# Patient Record
Sex: FEMALE | Race: WHITE | NOT HISPANIC OR LATINO | Employment: FULL TIME | URBAN - METROPOLITAN AREA
[De-identification: names, ages, dates, MRNs, and addresses within clinical notes are randomized per-mention and may not be internally consistent; named-entity substitution may affect disease eponyms.]

---

## 2023-06-21 ENCOUNTER — APPOINTMENT (OUTPATIENT)
Dept: LAB | Facility: LAB | Age: 48
End: 2023-06-21
Payer: COMMERCIAL

## 2023-06-21 LAB
ALANINE AMINOTRANSFERASE (SGPT) (U/L) IN SER/PLAS: 89 U/L (ref 7–45)
ALBUMIN (G/DL) IN SER/PLAS: 5 G/DL (ref 3.4–5)
ALKALINE PHOSPHATASE (U/L) IN SER/PLAS: 160 U/L (ref 33–110)
ANION GAP IN SER/PLAS: 15 MMOL/L (ref 10–20)
ASPARTATE AMINOTRANSFERASE (SGOT) (U/L) IN SER/PLAS: 38 U/L (ref 9–39)
BASOPHILS (10*3/UL) IN BLOOD BY AUTOMATED COUNT: 0.09 X10E9/L (ref 0–0.1)
BASOPHILS/100 LEUKOCYTES IN BLOOD BY AUTOMATED COUNT: 0.9 % (ref 0–2)
BILIRUBIN TOTAL (MG/DL) IN SER/PLAS: 0.3 MG/DL (ref 0–1.2)
CALCIUM (MG/DL) IN SER/PLAS: 10.2 MG/DL (ref 8.6–10.6)
CARBON DIOXIDE, TOTAL (MMOL/L) IN SER/PLAS: 24 MMOL/L (ref 21–32)
CHLORIDE (MMOL/L) IN SER/PLAS: 104 MMOL/L (ref 98–107)
CREATININE (MG/DL) IN SER/PLAS: 0.58 MG/DL (ref 0.5–1.05)
EOSINOPHILS (10*3/UL) IN BLOOD BY AUTOMATED COUNT: 0.11 X10E9/L (ref 0–0.7)
EOSINOPHILS/100 LEUKOCYTES IN BLOOD BY AUTOMATED COUNT: 1.1 % (ref 0–6)
ERYTHROCYTE DISTRIBUTION WIDTH (RATIO) BY AUTOMATED COUNT: 17 % (ref 11.5–14.5)
ERYTHROCYTE MEAN CORPUSCULAR HEMOGLOBIN CONCENTRATION (G/DL) BY AUTOMATED: 32.8 G/DL (ref 32–36)
ERYTHROCYTE MEAN CORPUSCULAR VOLUME (FL) BY AUTOMATED COUNT: 86 FL (ref 80–100)
ERYTHROCYTES (10*6/UL) IN BLOOD BY AUTOMATED COUNT: 4.69 X10E12/L (ref 4–5.2)
GFR FEMALE: >90 ML/MIN/1.73M2
GLUCOSE (MG/DL) IN SER/PLAS: 99 MG/DL (ref 74–99)
HEMATOCRIT (%) IN BLOOD BY AUTOMATED COUNT: 40.5 % (ref 36–46)
HEMOGLOBIN (G/DL) IN BLOOD: 13.3 G/DL (ref 12–16)
IMMATURE GRANULOCYTES/100 LEUKOCYTES IN BLOOD BY AUTOMATED COUNT: 0.3 % (ref 0–0.9)
INR IN PPP BY COAGULATION ASSAY: 1 (ref 0.9–1.1)
LEUKOCYTES (10*3/UL) IN BLOOD BY AUTOMATED COUNT: 10 X10E9/L (ref 4.4–11.3)
LYMPHOCYTES (10*3/UL) IN BLOOD BY AUTOMATED COUNT: 2.19 X10E9/L (ref 1.2–4.8)
LYMPHOCYTES/100 LEUKOCYTES IN BLOOD BY AUTOMATED COUNT: 21.9 % (ref 13–44)
MONOCYTES (10*3/UL) IN BLOOD BY AUTOMATED COUNT: 0.62 X10E9/L (ref 0.1–1)
MONOCYTES/100 LEUKOCYTES IN BLOOD BY AUTOMATED COUNT: 6.2 % (ref 2–10)
NEUTROPHILS (10*3/UL) IN BLOOD BY AUTOMATED COUNT: 6.97 X10E9/L (ref 1.2–7.7)
NEUTROPHILS/100 LEUKOCYTES IN BLOOD BY AUTOMATED COUNT: 69.6 % (ref 40–80)
NRBC (PER 100 WBCS) BY AUTOMATED COUNT: 0 /100 WBC (ref 0–0)
PLATELETS (10*3/UL) IN BLOOD AUTOMATED COUNT: 433 X10E9/L (ref 150–450)
POTASSIUM (MMOL/L) IN SER/PLAS: 4.7 MMOL/L (ref 3.5–5.3)
PROTEIN TOTAL: 7.6 G/DL (ref 6.4–8.2)
PROTHROMBIN TIME (PT) IN PPP BY COAGULATION ASSAY: 10.8 SEC (ref 9.8–12.8)
SODIUM (MMOL/L) IN SER/PLAS: 138 MMOL/L (ref 136–145)
UREA NITROGEN (MG/DL) IN SER/PLAS: 23 MG/DL (ref 6–23)

## 2023-09-05 ENCOUNTER — OFFICE VISIT (OUTPATIENT)
Dept: PRIMARY CARE | Facility: CLINIC | Age: 48
End: 2023-09-05
Payer: COMMERCIAL

## 2023-09-05 VITALS
DIASTOLIC BLOOD PRESSURE: 84 MMHG | SYSTOLIC BLOOD PRESSURE: 142 MMHG | HEIGHT: 62 IN | BODY MASS INDEX: 33.86 KG/M2 | WEIGHT: 184 LBS

## 2023-09-05 DIAGNOSIS — Z13.220 LIPID SCREENING: ICD-10-CM

## 2023-09-05 DIAGNOSIS — E13.9 OTHER SPECIFIED DIABETES MELLITUS WITHOUT COMPLICATION, WITHOUT LONG-TERM CURRENT USE OF INSULIN (MULTI): ICD-10-CM

## 2023-09-05 DIAGNOSIS — F32.A DEPRESSION, UNSPECIFIED DEPRESSION TYPE: ICD-10-CM

## 2023-09-05 DIAGNOSIS — F41.9 ANXIETY: ICD-10-CM

## 2023-09-05 DIAGNOSIS — E55.9 VITAMIN D DEFICIENCY: ICD-10-CM

## 2023-09-05 DIAGNOSIS — R53.83 OTHER FATIGUE: ICD-10-CM

## 2023-09-05 PROBLEM — D50.8 IRON DEFICIENCY ANEMIA SECONDARY TO INADEQUATE DIETARY IRON INTAKE: Status: ACTIVE | Noted: 2023-09-05

## 2023-09-05 PROBLEM — E87.6 HYPOKALEMIA: Status: ACTIVE | Noted: 2023-09-05

## 2023-09-05 PROCEDURE — 3077F SYST BP >= 140 MM HG: CPT | Performed by: INTERNAL MEDICINE

## 2023-09-05 PROCEDURE — 3079F DIAST BP 80-89 MM HG: CPT | Performed by: INTERNAL MEDICINE

## 2023-09-05 PROCEDURE — 99203 OFFICE O/P NEW LOW 30 MIN: CPT | Performed by: INTERNAL MEDICINE

## 2023-09-05 RX ORDER — BUPROPION HYDROCHLORIDE 150 MG/1
150 TABLET, EXTENDED RELEASE ORAL 2 TIMES DAILY
Qty: 180 TABLET | Refills: 0 | Status: SHIPPED | OUTPATIENT
Start: 2023-09-05 | End: 2024-01-18 | Stop reason: SDUPTHER

## 2023-09-05 RX ORDER — METFORMIN HYDROCHLORIDE 1000 MG/1
1000 TABLET ORAL
Qty: 90 TABLET | Refills: 0 | Status: SHIPPED | OUTPATIENT
Start: 2023-09-05 | End: 2023-10-12 | Stop reason: DRUGHIGH

## 2023-09-05 RX ORDER — BUPROPION HYDROCHLORIDE 100 MG/1
TABLET, EXTENDED RELEASE ORAL EVERY 24 HOURS
COMMUNITY
End: 2023-09-05 | Stop reason: SDUPTHER

## 2023-09-05 ASSESSMENT — ENCOUNTER SYMPTOMS
LOSS OF SENSATION IN FEET: 0
OCCASIONAL FEELINGS OF UNSTEADINESS: 0
DEPRESSION: 0

## 2023-09-05 NOTE — PROGRESS NOTES
OFFICE NOTE    NAME OF THE PATIENT: Debby Cabrera    YOB: 1975    CHIEF COMPLAINT:  This is a 48-year-old white lady came to my office first time, I welcomed her.  She told me:  She has gained some weight.  She had a gastric bypass 13 years ago.  She has type 2 diabetes.  She wants to readdress the issue.  Anxiety and stress.  She ran out of Wellbutrin.  She wants to go on Wellbutrin.  She is looking for new primary care doctor since her doctor's office has closed down.    PAST MEDICAL HISTORY:  Reviewed from EMR.  She has type 2 diabetes, anxiety, depression, postmenopausal.  Status post gastric bypass.  Her peak weight was 258 pounds, she was down to 118 pounds, basically she lost 150 pounds by 2010.  Now, she is back.  She is gained almost half of that.    CURRENT MEDICATIONS:  Reviewed from EMR.  She is on Wellbutrin, she ran out and metformin, she is not taking.  Intravenous iron and B12 for aplastic anemia.  ______ medication.    ALLERGIES:  Reviewed from EMR.  Amoxicillin.    SOCIAL HISTORY:  Reviewed from EMR.  She does not smoke and does not drink alcohol.  Occupation, retired nurse.  She is single.  Two children.  Children okay.    FAMILY HISTORY:  Reviewed from EMR.  Mother had a breast cancer.  Father has hypertension.    IMMUNIZATION:  Tetanus within the last 10 years.    REVIEW OF SYSTEMS:  No heart attack.  No stroke.  No cancer.  All 12 systems reviewed and pertaining covered in history and physical.    PHYSICAL EXAMINATION  VITAL SIGNS:  As recorded and reviewed from EMR.  EYES:  The patient's sclerae white.  The pupils were equal and round.  ENT:  The patient's external ears were normal and the otoscopic examination was negative.  NECK:  Supple.  There were no palpable masses.  Thyroid was not enlarged and there were no carotid bruits.  RESPIRATORY:  The patient had normal inspirations and expirations.  The breath sounds were equal bilaterally and clear to  "auscultation.  CARDIOVASCULAR:  The patient had S1 normal, split S2 without obvious rubs, clicks, or murmurs.  GASTROINTESTINAL:  There was no hepatosplenomegaly.  There were no palpable masses and no inguinal nodes.  LYMPHATIC:  The patient had no axillary, groin, or lymphadenopathy.  MUSCULOSKELETAL:  The patient had normal gait.  The joints appeared to be normal without evidence of deformity.  Grossly the muscles had good range of motion and were without effusion.  EXTREMITIES:  There was no evidence of peripheral edema.  NEUROLOGIC:  The patient had normal cranial nerves.  The reflexes, sensory, and motor examination were grossly within normal limits.  PSYCHIATRIC:  The patient had normal judgment and insight.  The patient was oriented to person, place, and time, and had no obvious mood defect including depression, anxiety, and/or agitation.  BREAST: Deferred by the patient.  VAGINAL: Deferred by the patient.  RECTAL: Deferred by the patient.    LAB WORK:  Laboratory testing discussed.    ASSESSMENT AND PLAN:  Status post gastric bypass.  Monitor.  Sleep apnea.  She needs a new CPAP machine and sleep machine.  Pre-diabetic.  I will check hemoglobin A1c and blood work.  We will start metformin.  She will benefit from drugs like Mounjaro.  Gynecologist.  She is going to set up Pap test and mammogram.  She already has it.  Colonoscopy started at 50.  Immunization.  We will monitor.  Complete blood work ordered.  I welcomed her to my office.  I will see her in a week after test.  I will ______ the patient has uterine fibroid.  ______ bleeds a lot.  She is going to have procedure.      Kindly review this note in conjunction with EMR.   Subjective   Patient ID: Debby Cabrera is a 48 y.o. female who presents for New Patient Visit.      HPI    Review of Systems    Objective   /84   Ht 1.575 m (5' 2\")   Wt 83.5 kg (184 lb)   BMI 33.65 kg/m²       Physical Exam    Assessment/Plan   Problem List Items Addressed " This Visit       Anxiety    Relevant Medications    buPROPion SR (Wellbutrin SR) 150 mg 12 hr tablet    Depression    Relevant Medications    buPROPion SR (Wellbutrin SR) 150 mg 12 hr tablet     Other Visit Diagnoses       Other specified diabetes mellitus without complication, without long-term current use of insulin (CMS/[facility])        Relevant Medications    metFORMIN (Glucophage) 1,000 mg tablet    Other Relevant Orders    Hemoglobin A1c    Lipid screening        Relevant Orders    Comprehensive metabolic panel    Lipid panel    Other fatigue        Relevant Orders    CBC    Urinalysis with Reflex Microscopic    TSH    Vitamin B12    Vitamin D deficiency        Relevant Orders    Vitamin D 25-Hydroxy,Total (for eval of Vitamin D levels)

## 2023-09-06 ENCOUNTER — LAB (OUTPATIENT)
Dept: LAB | Facility: LAB | Age: 48
End: 2023-09-06
Payer: COMMERCIAL

## 2023-09-06 DIAGNOSIS — Z13.220 LIPID SCREENING: ICD-10-CM

## 2023-09-06 DIAGNOSIS — E55.9 VITAMIN D DEFICIENCY: ICD-10-CM

## 2023-09-06 DIAGNOSIS — R53.83 OTHER FATIGUE: ICD-10-CM

## 2023-09-06 DIAGNOSIS — E13.9 OTHER SPECIFIED DIABETES MELLITUS WITHOUT COMPLICATION, WITHOUT LONG-TERM CURRENT USE OF INSULIN (MULTI): ICD-10-CM

## 2023-09-06 LAB
ALANINE AMINOTRANSFERASE (SGPT) (U/L) IN SER/PLAS: 29 U/L (ref 7–45)
ALBUMIN (G/DL) IN SER/PLAS: 4.8 G/DL (ref 3.4–5)
ALKALINE PHOSPHATASE (U/L) IN SER/PLAS: 93 U/L (ref 33–110)
ANION GAP IN SER/PLAS: 19 MMOL/L (ref 10–20)
APPEARANCE, URINE: NORMAL
ASPARTATE AMINOTRANSFERASE (SGOT) (U/L) IN SER/PLAS: 19 U/L (ref 9–39)
BILIRUBIN TOTAL (MG/DL) IN SER/PLAS: 0.3 MG/DL (ref 0–1.2)
BILIRUBIN, URINE: NEGATIVE
BLOOD, URINE: NEGATIVE
CALCIDIOL (25 OH VITAMIN D3) (NG/ML) IN SER/PLAS: 16 NG/ML
CALCIUM (MG/DL) IN SER/PLAS: 10.1 MG/DL (ref 8.6–10.6)
CARBON DIOXIDE, TOTAL (MMOL/L) IN SER/PLAS: 21 MMOL/L (ref 21–32)
CHLORIDE (MMOL/L) IN SER/PLAS: 105 MMOL/L (ref 98–107)
CHOLESTEROL (MG/DL) IN SER/PLAS: 208 MG/DL (ref 0–199)
CHOLESTEROL IN HDL (MG/DL) IN SER/PLAS: 51.3 MG/DL
CHOLESTEROL/HDL RATIO: 4.1
COBALAMIN (VITAMIN B12) (PG/ML) IN SER/PLAS: 459 PG/ML (ref 211–911)
COLOR, URINE: YELLOW
CREATININE (MG/DL) IN SER/PLAS: 0.68 MG/DL (ref 0.5–1.05)
ERYTHROCYTE DISTRIBUTION WIDTH (RATIO) BY AUTOMATED COUNT: 13.8 % (ref 11.5–14.5)
ERYTHROCYTE MEAN CORPUSCULAR HEMOGLOBIN CONCENTRATION (G/DL) BY AUTOMATED: 32.3 G/DL (ref 32–36)
ERYTHROCYTE MEAN CORPUSCULAR VOLUME (FL) BY AUTOMATED COUNT: 86 FL (ref 80–100)
ERYTHROCYTES (10*6/UL) IN BLOOD BY AUTOMATED COUNT: 4.86 X10E12/L (ref 4–5.2)
ESTIMATED AVERAGE GLUCOSE FOR HBA1C: 105 MG/DL
GFR FEMALE: >90 ML/MIN/1.73M2
GLUCOSE (MG/DL) IN SER/PLAS: 198 MG/DL (ref 74–99)
GLUCOSE, URINE: NEGATIVE MG/DL
HEMATOCRIT (%) IN BLOOD BY AUTOMATED COUNT: 41.8 % (ref 36–46)
HEMOGLOBIN (G/DL) IN BLOOD: 13.5 G/DL (ref 12–16)
HEMOGLOBIN A1C/HEMOGLOBIN TOTAL IN BLOOD: 5.3 %
KETONES, URINE: NEGATIVE MG/DL
LDL: 115 MG/DL (ref 0–99)
LEUKOCYTE ESTERASE, URINE: NEGATIVE
LEUKOCYTES (10*3/UL) IN BLOOD BY AUTOMATED COUNT: 9.5 X10E9/L (ref 4.4–11.3)
NITRITE, URINE: NEGATIVE
NON HDL CHOLESTEROL: 157 MG/DL
NRBC (PER 100 WBCS) BY AUTOMATED COUNT: 0 /100 WBC (ref 0–0)
PH, URINE: 5 (ref 5–8)
PLATELETS (10*3/UL) IN BLOOD AUTOMATED COUNT: 462 X10E9/L (ref 150–450)
POTASSIUM (MMOL/L) IN SER/PLAS: 3.8 MMOL/L (ref 3.5–5.3)
PROTEIN TOTAL: 7.2 G/DL (ref 6.4–8.2)
PROTEIN, URINE: NEGATIVE MG/DL
SODIUM (MMOL/L) IN SER/PLAS: 141 MMOL/L (ref 136–145)
SPECIFIC GRAVITY, URINE: 1.03 (ref 1–1.03)
THYROTROPIN (MIU/L) IN SER/PLAS BY DETECTION LIMIT <= 0.05 MIU/L: 2.09 MIU/L (ref 0.44–3.98)
TRIGLYCERIDE (MG/DL) IN SER/PLAS: 208 MG/DL (ref 0–149)
UREA NITROGEN (MG/DL) IN SER/PLAS: 14 MG/DL (ref 6–23)
UROBILINOGEN, URINE: <2 MG/DL (ref 0–1.9)
VLDL: 42 MG/DL (ref 0–40)

## 2023-09-06 PROCEDURE — 85027 COMPLETE CBC AUTOMATED: CPT

## 2023-09-06 PROCEDURE — 82306 VITAMIN D 25 HYDROXY: CPT

## 2023-09-06 PROCEDURE — 80053 COMPREHEN METABOLIC PANEL: CPT

## 2023-09-06 PROCEDURE — 82607 VITAMIN B-12: CPT

## 2023-09-06 PROCEDURE — 81003 URINALYSIS AUTO W/O SCOPE: CPT

## 2023-09-06 PROCEDURE — 36415 COLL VENOUS BLD VENIPUNCTURE: CPT

## 2023-09-06 PROCEDURE — 84443 ASSAY THYROID STIM HORMONE: CPT

## 2023-09-06 PROCEDURE — 83036 HEMOGLOBIN GLYCOSYLATED A1C: CPT

## 2023-09-06 PROCEDURE — 80061 LIPID PANEL: CPT

## 2023-09-11 ENCOUNTER — OFFICE VISIT (OUTPATIENT)
Dept: PRIMARY CARE | Facility: CLINIC | Age: 48
End: 2023-09-11
Payer: COMMERCIAL

## 2023-09-11 VITALS
DIASTOLIC BLOOD PRESSURE: 84 MMHG | BODY MASS INDEX: 33.49 KG/M2 | HEIGHT: 62 IN | SYSTOLIC BLOOD PRESSURE: 126 MMHG | WEIGHT: 182 LBS

## 2023-09-11 DIAGNOSIS — E88.810 DYSMETABOLIC SYNDROME: ICD-10-CM

## 2023-09-11 DIAGNOSIS — E78.00 HIGH CHOLESTEROL: ICD-10-CM

## 2023-09-11 DIAGNOSIS — F32.A DEPRESSION, UNSPECIFIED DEPRESSION TYPE: ICD-10-CM

## 2023-09-11 DIAGNOSIS — F41.9 ANXIETY: ICD-10-CM

## 2023-09-11 DIAGNOSIS — E11.9 TYPE 2 DIABETES MELLITUS WITHOUT COMPLICATION, WITHOUT LONG-TERM CURRENT USE OF INSULIN (MULTI): Primary | ICD-10-CM

## 2023-09-11 DIAGNOSIS — E66.3 OVERWEIGHT: ICD-10-CM

## 2023-09-11 DIAGNOSIS — E55.9 VITAMIN D DEFICIENCY: ICD-10-CM

## 2023-09-11 PROCEDURE — 3044F HG A1C LEVEL LT 7.0%: CPT | Performed by: INTERNAL MEDICINE

## 2023-09-11 PROCEDURE — 3079F DIAST BP 80-89 MM HG: CPT | Performed by: INTERNAL MEDICINE

## 2023-09-11 PROCEDURE — 3074F SYST BP LT 130 MM HG: CPT | Performed by: INTERNAL MEDICINE

## 2023-09-11 PROCEDURE — 99213 OFFICE O/P EST LOW 20 MIN: CPT | Performed by: INTERNAL MEDICINE

## 2023-09-11 RX ORDER — ACETAMINOPHEN 500 MG
5000 TABLET ORAL DAILY
Qty: 30 TABLET | Refills: 0 | Status: SHIPPED | OUTPATIENT
Start: 2023-09-11

## 2023-09-11 ASSESSMENT — ENCOUNTER SYMPTOMS
DEPRESSION: 0
LOSS OF SENSATION IN FEET: 0
OCCASIONAL FEELINGS OF UNSTEADINESS: 0

## 2023-09-11 NOTE — PROGRESS NOTES
"Subjective   Patient ID: Debby Cabrera is a 48 y.o. female who presents for Follow-up.      HPI    Review of Systems    Objective   /84   Ht 1.575 m (5' 2\")   Wt 82.6 kg (182 lb)   BMI 33.29 kg/m²       Physical Exam    Assessment/Plan   Problem List Items Addressed This Visit    None        "

## 2023-09-11 NOTE — PROGRESS NOTES
"Subjective   Patient ID: Debby Cabrera is a 48 y.o. female who presents for Follow-up.    HPI   Debby Cabrera today came here for multiple medical issues.  Overall, she is a happy person.  No chest pain or shortness of breath.  Taking medications regularly.  No side effects.  She told me that when she takes metformin, sometimes she gets diarrhea, abdominal cramping.  She is not consistent with that.  She wants to try Ozempic to lose weight.  She is trying hard to lose weight.  Appetite and weight are okay.  No problem.    Review of Systems  She never had a heart attack.  No stroke.  No cancer.  All 12 systems reviewed and pertaining covered in history and physical.    Objective   /84   Ht 1.575 m (5' 2\")   Wt 82.6 kg (182 lb)   BMI 33.29 kg/m²     Physical Exam  EYES:  The patient’s sclerae white.  The pupils were equal and round.  ENT:  The patient’s external ears were normal and the otoscopic examination was negative.  NECK:  Supple.  There were no palpable masses.  Thyroid was not enlarged and there were no carotid bruits.  RESPIRATORY:  The patient had normal inspirations and expirations.  The breath sounds were equal bilaterally and clear to auscultation.  CARDIOVASCULAR:  The patient had S1 normal, split S2 without obvious rubs, clicks, or murmurs.  GASTROINTESTINAL:  There was no hepatosplenomegaly.  There were no palpable masses and no inguinal nodes.  LYMPHATIC:  The patient had no axillary, groin, or lymphadenopathy.  MUSCULOSKELETAL:  The patient had normal gait.  The joints appeared to be normal without evidence of deformity.  Grossly the muscles had good range of motion and were without effusion.  EXTREMITIES:  There was no evidence of peripheral edema.  NEUROLOGIC:  The patient had normal cranial nerves.  The reflexes, sensory, and motor examination were grossly within normal limits.  PSYCHIATRIC:  The patient had normal judgment and insight.  The patient was oriented to person, place, and time, " and had no obvious mood defect including depression, anxiety, and/or agitation.  BREAST:  Deferred by the patient.  VAGINAL:  Deferred by the patient.  RECTAL:  Deferred by the patient.    LAB WORK:  Laboratory testing done discussed.    Assessment/Plan     1. Low vitamin D.  Take vitamin D.  2. Type 2 diabetes, overweight, cannot lose weight, family history.  Metformin she is inconsistent.  Idiosyncrasy is that metformin she is not taking.  Fasting sugar is high and the patient says that sugar goes up and down.  3. It looks like probably the patient has dysmetabolic syndrome along with the clinical diabetes, cannot tolerate metformin.  She wants to try Ozempic.  Hoping that it will get her sugar under control and lose weight, but I told her danger of hypoglycemia, loss of appetite and weakness.  She is willing to take that risk.  4. Anxiety and depression, okay.  5. Cholesterol.  Diet, exercise.  Monitor.  6. Overweight.  Ozempic should help her diet.  7. I shall see her back in a month.  I explained her we will start with very low dose.  8. Continue to follow.    Scribe Attestation  By signing my name below, I, Bimal Batista   attest that this documentation has been prepared under the direction and in the presence of Monse Laura MD.

## 2023-09-14 DIAGNOSIS — E11.9 TYPE 2 DIABETES MELLITUS WITHOUT COMPLICATION, WITHOUT LONG-TERM CURRENT USE OF INSULIN (MULTI): ICD-10-CM

## 2023-09-28 PROBLEM — D64.9 ANEMIA: Status: ACTIVE | Noted: 2023-09-28

## 2023-09-28 PROBLEM — N93.9 ABNORMAL UTERINE BLEEDING: Status: ACTIVE | Noted: 2023-09-28

## 2023-09-28 PROBLEM — N12 PYELONEPHRITIS: Status: ACTIVE | Noted: 2023-09-28

## 2023-09-28 PROBLEM — R35.0 URINARY FREQUENCY: Status: ACTIVE | Noted: 2023-09-28

## 2023-09-28 PROBLEM — D21.9 FIBROID: Status: ACTIVE | Noted: 2023-09-28

## 2023-09-28 PROBLEM — B37.0 ORAL CANDIDIASIS: Status: ACTIVE | Noted: 2023-09-28

## 2023-09-28 PROBLEM — K14.0 GLOSSITIS: Status: ACTIVE | Noted: 2023-09-28

## 2023-09-28 RX ORDER — LIDOCAINE HYDROCHLORIDE 40 MG/ML
5 SOLUTION TOPICAL 4 TIMES DAILY PRN
COMMUNITY

## 2023-09-28 RX ORDER — NORETHINDRONE 5 MG/1
5 TABLET ORAL 2 TIMES DAILY
COMMUNITY
End: 2024-01-18 | Stop reason: WASHOUT

## 2023-09-28 RX ORDER — NYSTATIN 100000 [USP'U]/ML
500000 SUSPENSION ORAL 4 TIMES DAILY
COMMUNITY

## 2023-09-29 DIAGNOSIS — M25.512 LEFT SHOULDER PAIN, UNSPECIFIED CHRONICITY: ICD-10-CM

## 2023-10-02 ENCOUNTER — OFFICE VISIT (OUTPATIENT)
Dept: ORTHOPEDIC SURGERY | Facility: CLINIC | Age: 48
End: 2023-10-02
Payer: COMMERCIAL

## 2023-10-02 ENCOUNTER — ROUTINE PRENATAL (OUTPATIENT)
Dept: OBSTETRICS AND GYNECOLOGY | Facility: CLINIC | Age: 48
End: 2023-10-02
Payer: COMMERCIAL

## 2023-10-02 ENCOUNTER — HOSPITAL ENCOUNTER (OUTPATIENT)
Dept: RADIOLOGY | Facility: HOSPITAL | Age: 48
Discharge: HOME | End: 2023-10-02
Payer: COMMERCIAL

## 2023-10-02 DIAGNOSIS — M25.512 ACUTE PAIN OF LEFT SHOULDER: ICD-10-CM

## 2023-10-02 DIAGNOSIS — M25.512 LEFT SHOULDER PAIN, UNSPECIFIED CHRONICITY: ICD-10-CM

## 2023-10-02 DIAGNOSIS — D25.9 UTERINE LEIOMYOMA, UNSPECIFIED LOCATION: Primary | ICD-10-CM

## 2023-10-02 PROCEDURE — 73030 X-RAY EXAM OF SHOULDER: CPT | Mod: LT,FY

## 2023-10-02 PROCEDURE — 73030 X-RAY EXAM OF SHOULDER: CPT | Mod: LEFT SIDE | Performed by: RADIOLOGY

## 2023-10-02 PROCEDURE — 99203 OFFICE O/P NEW LOW 30 MIN: CPT

## 2023-10-02 ASSESSMENT — ENCOUNTER SYMPTOMS
HEMATOLOGIC/LYMPHATIC NEGATIVE: 0
NEUROLOGICAL NEGATIVE: 0
CARDIOVASCULAR NEGATIVE: 0
PSYCHIATRIC NEGATIVE: 0
ALLERGIC/IMMUNOLOGIC NEGATIVE: 0
ENDOCRINE NEGATIVE: 0
MUSCULOSKELETAL NEGATIVE: 0
CONSTITUTIONAL NEGATIVE: 0
GASTROINTESTINAL NEGATIVE: 0
EYES NEGATIVE: 0
RESPIRATORY NEGATIVE: 0

## 2023-10-02 NOTE — PROGRESS NOTES
HPI  Debby Cabrera is a 48 y.o. female  in office today for   Chief Complaint   Patient presents with    Left Shoulder - Pain, Edema   .  she states that about 2 weeks she was lifting something and felt a snap in her shoulder.  Did not have any pain immediately, but had pain the next day.  She states no improvement in pain in the last 2 weeks.  She has been taking Motrin which she can't take much of as she is post gastric bypass.  She has also been icing and heating.        Medication  Current Outpatient Medications on File Prior to Visit   Medication Sig Dispense Refill    buPROPion SR (Wellbutrin SR) 150 mg 12 hr tablet Take 1 tablet (150 mg) by mouth 2 times a day. 180 tablet 0    cholecalciferol (Vitamin D3) 5,000 Units tablet Take 1 tablet (5,000 Units) by mouth once daily. 30 tablet 0    lidocaine (Xylocaine) 4 % (40 mg/mL) external solution Take 5 mL by mouth 4 times a day as needed. SWALLOW AND SPIT      metFORMIN (Glucophage) 1,000 mg tablet Take 1 tablet (1,000 mg) by mouth once daily with a meal. 90 tablet 0    norethindrone (Aygestin) 5 mg tablet Take 1 tablet (5 mg) by mouth 2 times a day.      nystatin (Mycostatin) 100,000 unit/mL suspension Take 5 mL (500,000 Units) by mouth 4 times a day. SWISH AND SPIT, TO PREVENT DRY MOUTH AND MUCOSITIS      semaglutide 0.25 mg or 0.5 mg (2 mg/3 mL) pen injector Inject 0.5 mg under the skin 1 (one) time per week. 3 mL 0     No current facility-administered medications on file prior to visit.       Physical Exam  Constitutional: well developed, well nourished female in no acute distress  Psychiatric: normal mood, appropriate affect  Eyes: sclera anicteric  HENT: normocephalic/atraumatic  CV: regular rate and rhythm   Respiratory: non labored breathing  Integumentary: no rash  Neurological: moves all extremities    Right Shoulder Exam     Range of Motion   External rotation:  80     Muscle Strength   Abduction: 5/5   External rotation: 5/5       Left Shoulder Exam      Tenderness   The patient is experiencing tenderness in the biceps tendon.    Range of Motion   Active abduction:  90   Passive abduction:  110 (secondary to pain)   External rotation:  30   Forward flexion:  100   Internal rotation 0 degrees:  abnormal   Internal rotation 90 degrees:  abnormal     Muscle Strength   Abduction: 4/5   External rotation: 4/5     Tests   Apprehension: negative  Cross arm: positive  Impingement: positive    Other   Erythema: absent  Scars: absent               Imaging/Lab:  X-rays were taken today which were reviewed by myself and read by radiology and show mild AC joint osteoarthritis      Assessment  Assessment: Left shoulder pain    Plan  Plan:  History, physical exam, and imaging were reviewed with patient. Discussed starting with conservative treatment of the shoulder including RICE, injections, OT.  She is not excited about injections, but willing to try OT.  OT orders given to patient.  Medication: Recommending topical diclofenac as she is unable to take NSAIDs post gastric bypass.  Follow Up: Patient to follow up after trial of therapy.    All questions were answered for the patient prior to end of exam and patient addressed their understanding.    Tonia Schaefer PA-C  10/02/23

## 2023-10-02 NOTE — ASSESSMENT & PLAN NOTE
Pt is concerned  about her heavy bleeding   Pt wants a UFE    Will plan for UFE  Continue Aygestin for now

## 2023-10-02 NOTE — LETTER
October 2, 2023     Monse Laura MD  [address] [place] [address]   [facility], [address]  [place] [address]    Patient: Debby Cabrera   YOB: 1975   Date of Visit: 10/2/2023       Dear Dr. Monse Laura MD:    Thank you for referring Debby Cabrera to me for evaluation. Below are my notes for this consultation.  If you have questions, please do not hesitate to call me. I look forward to following your patient along with you.       Sincerely,     Tonia Schaefer PA-C      CC: No Recipients  ______________________________________________________________________________________    HPI  Debby Cabrera is a 48 y.o. female  in office today for   Chief Complaint   Patient presents with   • Left Shoulder - Pain, Edema   .  she states that about 2 weeks she was lifting something and felt a snap in her shoulder.  Did not have any pain immediately, but had pain the next day.  She states no improvement in pain in the last 2 weeks.  She has been taking Motrin which she can't take much of as she is post gastric bypass.  She has also been icing and heating.        Medication  Current Outpatient Medications on File Prior to Visit   Medication Sig Dispense Refill   • buPROPion SR (Wellbutrin SR) 150 mg 12 hr tablet Take 1 tablet (150 mg) by mouth 2 times a day. 180 tablet 0   • cholecalciferol (Vitamin D3) 5,000 Units tablet Take 1 tablet (5,000 Units) by mouth once daily. 30 tablet 0   • lidocaine (Xylocaine) 4 % (40 mg/mL) external solution Take 5 mL by mouth 4 times a day as needed. SWALLOW AND SPIT     • metFORMIN (Glucophage) 1,000 mg tablet Take 1 tablet (1,000 mg) by mouth once daily with a meal. 90 tablet 0   • norethindrone (Aygestin) 5 mg tablet Take 1 tablet (5 mg) by mouth 2 times a day.     • nystatin (Mycostatin) 100,000 unit/mL suspension Take 5 mL (500,000 Units) by mouth 4 times a day. SWISH AND SPIT, TO PREVENT DRY MOUTH AND MUCOSITIS     • semaglutide 0.25 mg or 0.5 mg (2 mg/3 mL) pen injector  Inject 0.5 mg under the skin 1 (one) time per week. 3 mL 0     No current facility-administered medications on file prior to visit.       Physical Exam  Constitutional: well developed, well nourished female in no acute distress  Psychiatric: normal mood, appropriate affect  Eyes: sclera anicteric  HENT: normocephalic/atraumatic  CV: regular rate and rhythm   Respiratory: non labored breathing  Integumentary: no rash  Neurological: moves all extremities    Right Shoulder Exam     Range of Motion   External rotation:  80     Muscle Strength   Abduction: 5/5   External rotation: 5/5       Left Shoulder Exam     Tenderness   The patient is experiencing tenderness in the biceps tendon.    Range of Motion   Active abduction:  90   Passive abduction:  110 (secondary to pain)   External rotation:  30   Forward flexion:  100   Internal rotation 0 degrees:  abnormal   Internal rotation 90 degrees:  abnormal     Muscle Strength   Abduction: 4/5   External rotation: 4/5     Tests   Apprehension: negative  Cross arm: positive  Impingement: positive    Other   Erythema: absent  Scars: absent               Imaging/Lab:  X-rays were taken today which were reviewed by myself and read by radiology and show mild AC joint osteoarthritis      Assessment  Assessment: Left shoulder pain    Plan  Plan:  History, physical exam, and imaging were reviewed with patient. Discussed starting with conservative treatment of the shoulder including RICE, injections, OT.  She is not excited about injections, but willing to try OT.  OT orders given to patient.  Medication: Recommending topical diclofenac as she is unable to take NSAIDs post gastric bypass.  Follow Up: Patient to follow up after trial of therapy.    All questions were answered for the patient prior to end of exam and patient addressed their understanding.    Tonia Schaefer PA-C  10/02/23

## 2023-10-12 ENCOUNTER — OFFICE VISIT (OUTPATIENT)
Dept: PRIMARY CARE | Facility: CLINIC | Age: 48
End: 2023-10-12
Payer: MEDICAID

## 2023-10-12 VITALS
BODY MASS INDEX: 31.65 KG/M2 | WEIGHT: 172 LBS | SYSTOLIC BLOOD PRESSURE: 124 MMHG | DIASTOLIC BLOOD PRESSURE: 72 MMHG | HEIGHT: 62 IN

## 2023-10-12 DIAGNOSIS — R53.83 OTHER FATIGUE: ICD-10-CM

## 2023-10-12 DIAGNOSIS — F41.9 ANXIETY AND DEPRESSION: ICD-10-CM

## 2023-10-12 DIAGNOSIS — F32.A ANXIETY AND DEPRESSION: ICD-10-CM

## 2023-10-12 DIAGNOSIS — E78.00 HIGH CHOLESTEROL: ICD-10-CM

## 2023-10-12 DIAGNOSIS — E11.9 TYPE 2 DIABETES MELLITUS WITHOUT COMPLICATION, WITHOUT LONG-TERM CURRENT USE OF INSULIN (MULTI): Primary | ICD-10-CM

## 2023-10-12 PROCEDURE — 3044F HG A1C LEVEL LT 7.0%: CPT | Performed by: INTERNAL MEDICINE

## 2023-10-12 PROCEDURE — 99214 OFFICE O/P EST MOD 30 MIN: CPT | Performed by: INTERNAL MEDICINE

## 2023-10-12 PROCEDURE — 3074F SYST BP LT 130 MM HG: CPT | Performed by: INTERNAL MEDICINE

## 2023-10-12 PROCEDURE — 3078F DIAST BP <80 MM HG: CPT | Performed by: INTERNAL MEDICINE

## 2023-10-12 RX ORDER — METFORMIN HYDROCHLORIDE 1000 MG/1
1000 TABLET ORAL
Qty: 180 TABLET | Refills: 0 | Status: SHIPPED | OUTPATIENT
Start: 2023-10-12 | End: 2023-11-22 | Stop reason: SDUPTHER

## 2023-10-13 NOTE — PROGRESS NOTES
"Subjective   Patient ID: Debby Cabrera is a 48 y.o. female who presents for Follow-up (pharmacy).    It is always a delight to serve Ms. Cabrera.  Today she came here for follow-up.  She found the pharmacy where she can get drug inexpensively.  She is feeling much better.    I have personally reviewed the patient's Past Medical History, Medications, Allergies, Social History, and Family History in the EMR.    Review of Systems   All other systems reviewed and are negative.    Objective   /72   Ht 1.575 m (5' 2\")   Wt 78 kg (172 lb)   BMI 31.46 kg/m²     Physical Exam  Vitals reviewed.   Cardiovascular:      Heart sounds: Normal heart sounds, S1 normal and S2 normal. No murmur heard.     No friction rub.   Pulmonary:      Effort: Pulmonary effort is normal.      Breath sounds: Normal breath sounds and air entry.   Abdominal:      Palpations: There is no hepatomegaly, splenomegaly or mass.   Musculoskeletal:      Right lower leg: No edema.      Left lower leg: No edema.   Lymphadenopathy:      Lower Body: No right inguinal adenopathy. No left inguinal adenopathy.   Neurological:      Cranial Nerves: Cranial nerves 2-12 are intact.      Sensory: No sensory deficit.      Motor: Motor function is intact.      Deep Tendon Reflexes: Reflexes are normal and symmetric.     LAB WORK: Laboratory testing discussed.    Assessment/Plan   Problem List Items Addressed This Visit    None  Visit Diagnoses         Codes    Type 2 diabetes mellitus without complication, without long-term current use of insulin (CMS/[facility])    -  Primary E11.9    Relevant Medications    semaglutide (OZEMPIC) 1 mg/dose (4 mg/3 mL) pen injector    metFORMIN (Glucophage) 1,000 mg tablet    Other Relevant Orders    Hemoglobin A1c    High cholesterol     E78.00    Relevant Orders    Comprehensive metabolic panel    Lipid panel    Other fatigue     R53.83    Relevant Orders    TSH    Anxiety and depression     F41.9, F32.A        1. Type 2 diabetes.  Keep " metformin 1 g twice a day.  Ozempic 0.5 mg, we are going to start.  2. Anxiety and depression, okay.  3. Cholesterol.  Monitor.  4. Follow up in a month with repeat blood work.  Happy to see her anytime sooner if necessary.    Scribe Attestation  By signing my name below, IBarby Scribe attest that this documentation has been prepared under the direction and in the presence of Monse Laura MD.

## 2023-11-22 DIAGNOSIS — E11.9 TYPE 2 DIABETES MELLITUS WITHOUT COMPLICATION, WITHOUT LONG-TERM CURRENT USE OF INSULIN (MULTI): ICD-10-CM

## 2023-11-22 RX ORDER — METFORMIN HYDROCHLORIDE 1000 MG/1
1000 TABLET ORAL
Qty: 60 TABLET | Refills: 0 | Status: SHIPPED | OUTPATIENT
Start: 2023-11-22 | End: 2024-01-05 | Stop reason: SDUPTHER

## 2023-11-24 ENCOUNTER — OFFICE VISIT (OUTPATIENT)
Dept: PRIMARY CARE | Facility: CLINIC | Age: 48
End: 2023-11-24
Payer: COMMERCIAL

## 2023-11-24 VITALS
SYSTOLIC BLOOD PRESSURE: 124 MMHG | DIASTOLIC BLOOD PRESSURE: 68 MMHG | BODY MASS INDEX: 30.36 KG/M2 | WEIGHT: 165 LBS | HEIGHT: 62 IN

## 2023-11-24 DIAGNOSIS — F32.A ANXIETY AND DEPRESSION: Primary | ICD-10-CM

## 2023-11-24 DIAGNOSIS — N92.2 EXCESSIVE MENSTRUATION AT PUBERTY: ICD-10-CM

## 2023-11-24 DIAGNOSIS — D50.0 ANEMIA DUE TO BLOOD LOSS: ICD-10-CM

## 2023-11-24 DIAGNOSIS — E78.00 HIGH CHOLESTEROL: ICD-10-CM

## 2023-11-24 DIAGNOSIS — F41.9 ANXIETY AND DEPRESSION: Primary | ICD-10-CM

## 2023-11-24 DIAGNOSIS — E11.9 TYPE 2 DIABETES MELLITUS WITHOUT COMPLICATION, WITHOUT LONG-TERM CURRENT USE OF INSULIN (MULTI): ICD-10-CM

## 2023-11-24 PROCEDURE — 3078F DIAST BP <80 MM HG: CPT | Performed by: INTERNAL MEDICINE

## 2023-11-24 PROCEDURE — 3044F HG A1C LEVEL LT 7.0%: CPT | Performed by: INTERNAL MEDICINE

## 2023-11-24 PROCEDURE — 99214 OFFICE O/P EST MOD 30 MIN: CPT | Performed by: INTERNAL MEDICINE

## 2023-11-24 PROCEDURE — 3074F SYST BP LT 130 MM HG: CPT | Performed by: INTERNAL MEDICINE

## 2023-11-24 NOTE — PROGRESS NOTES
"Subjective   Patient ID: Debby Cabrera is a 48 y.o. female who presents for Follow-up (Multiple medical issues).    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  She lost 20 pounds since last visit.  She wants to go high on Ozempic dose.  She is type 2 diabetic, trying to control very good.    IMMUNIZATION: Tetanus within the last 10 years.    I have personally reviewed the patient's Past Medical History, Medications, Allergies, Social History, and Family History in the EMR.    Review of Systems   All other systems reviewed and are negative.    Objective   /68   Ht 1.575 m (5' 2\")   Wt 74.8 kg (165 lb)   BMI 30.18 kg/m²     Physical Exam  Vitals reviewed.   Cardiovascular:      Heart sounds: Normal heart sounds, S1 normal and S2 normal. No murmur heard.     No friction rub.   Pulmonary:      Effort: Pulmonary effort is normal.      Breath sounds: Normal breath sounds and air entry.   Abdominal:      Palpations: There is no hepatomegaly, splenomegaly or mass.   Musculoskeletal:      Right lower leg: No edema.      Left lower leg: No edema.   Lymphadenopathy:      Lower Body: No right inguinal adenopathy. No left inguinal adenopathy.   Neurological:      Cranial Nerves: Cranial nerves 2-12 are intact.      Sensory: No sensory deficit.      Motor: Motor function is intact.      Deep Tendon Reflexes: Reflexes are normal and symmetric.     LAB WORK: Laboratory testing discussed.    Assessment/Plan   Problem List Items Addressed This Visit    None  Visit Diagnoses         Codes    Anxiety and depression    -  Primary F41.9, F32.A    High cholesterol     E78.00    Relevant Orders    Comprehensive metabolic panel    Type 2 diabetes mellitus without complication, without long-term current use of insulin (CMS/[facility])     E11.9    Relevant Medications    semaglutide (OZEMPIC) 1 mg/dose (4 mg/3 mL) pen injector    Other Relevant Orders    Hemoglobin A1c    Excessive menstruation at puberty     " N92.2    Relevant Orders    Referral to Obstetrics / Gynecology    Anemia due to blood loss     D50.0        1. Diabetes.  Metformin and Ozempic doing very well, 20-pound weight loss.  I congratulated her.  Ozempic 1 mg, we will go higher, one step up.  2. Anxiety and depression, okay.  3. Routine blood work ordered today.  I will communicate.  If everything is okay, I will see her in New Year, January.  4. Anemia because of excessive menstruation and fibroid.  She is requesting hysterectomy.  She wants a second opinion.  Referred to Dr. Blair Capone for hysterectomy.    Scribe Attestation  By signing my name below, I, Bimal Batista attest that this documentation has been prepared under the direction and in the presence of Monse Laura MD.

## 2023-12-20 ENCOUNTER — APPOINTMENT (OUTPATIENT)
Dept: OBSTETRICS AND GYNECOLOGY | Facility: CLINIC | Age: 48
End: 2023-12-20
Payer: COMMERCIAL

## 2024-01-04 ENCOUNTER — APPOINTMENT (OUTPATIENT)
Dept: HEMATOLOGY/ONCOLOGY | Facility: HOSPITAL | Age: 49
End: 2024-01-04
Payer: MEDICAID

## 2024-01-05 DIAGNOSIS — E11.9 TYPE 2 DIABETES MELLITUS WITHOUT COMPLICATION, WITHOUT LONG-TERM CURRENT USE OF INSULIN (MULTI): ICD-10-CM

## 2024-01-05 RX ORDER — METFORMIN HYDROCHLORIDE 1000 MG/1
1000 TABLET ORAL
Qty: 60 TABLET | Refills: 5 | Status: SHIPPED | OUTPATIENT
Start: 2024-01-05 | End: 2024-07-03

## 2024-01-16 ENCOUNTER — LAB (OUTPATIENT)
Dept: LAB | Facility: LAB | Age: 49
End: 2024-01-16
Payer: COMMERCIAL

## 2024-01-16 DIAGNOSIS — E11.9 TYPE 2 DIABETES MELLITUS WITHOUT COMPLICATION, WITHOUT LONG-TERM CURRENT USE OF INSULIN (MULTI): ICD-10-CM

## 2024-01-16 DIAGNOSIS — E78.00 HIGH CHOLESTEROL: ICD-10-CM

## 2024-01-16 DIAGNOSIS — R53.83 OTHER FATIGUE: ICD-10-CM

## 2024-01-16 PROCEDURE — 36415 COLL VENOUS BLD VENIPUNCTURE: CPT

## 2024-01-16 PROCEDURE — 83036 HEMOGLOBIN GLYCOSYLATED A1C: CPT

## 2024-01-16 PROCEDURE — 80061 LIPID PANEL: CPT

## 2024-01-16 PROCEDURE — 84443 ASSAY THYROID STIM HORMONE: CPT

## 2024-01-16 PROCEDURE — 80053 COMPREHEN METABOLIC PANEL: CPT

## 2024-01-17 LAB
ALBUMIN SERPL BCP-MCNC: 4.8 G/DL (ref 3.4–5)
ALP SERPL-CCNC: 71 U/L (ref 33–110)
ALT SERPL W P-5'-P-CCNC: 24 U/L (ref 7–45)
ANION GAP SERPL CALC-SCNC: 13 MMOL/L (ref 10–20)
AST SERPL W P-5'-P-CCNC: 23 U/L (ref 9–39)
BILIRUB SERPL-MCNC: 0.3 MG/DL (ref 0–1.2)
BUN SERPL-MCNC: 10 MG/DL (ref 6–23)
CALCIUM SERPL-MCNC: 10 MG/DL (ref 8.6–10.6)
CHLORIDE SERPL-SCNC: 103 MMOL/L (ref 98–107)
CHOLEST SERPL-MCNC: 184 MG/DL (ref 0–199)
CHOLESTEROL/HDL RATIO: 3.9
CO2 SERPL-SCNC: 29 MMOL/L (ref 21–32)
CREAT SERPL-MCNC: 0.55 MG/DL (ref 0.5–1.05)
EGFRCR SERPLBLD CKD-EPI 2021: >90 ML/MIN/1.73M*2
EST. AVERAGE GLUCOSE BLD GHB EST-MCNC: 91 MG/DL
GLUCOSE SERPL-MCNC: 97 MG/DL (ref 74–99)
HBA1C MFR BLD: 4.8 %
HDLC SERPL-MCNC: 46.9 MG/DL
LDLC SERPL CALC-MCNC: 111 MG/DL
NON HDL CHOLESTEROL: 137 MG/DL (ref 0–149)
POTASSIUM SERPL-SCNC: 4.4 MMOL/L (ref 3.5–5.3)
PROT SERPL-MCNC: 7.1 G/DL (ref 6.4–8.2)
SODIUM SERPL-SCNC: 141 MMOL/L (ref 136–145)
TRIGL SERPL-MCNC: 132 MG/DL (ref 0–149)
TSH SERPL-ACNC: 2.52 MIU/L (ref 0.44–3.98)
VLDL: 26 MG/DL (ref 0–40)

## 2024-01-18 ENCOUNTER — OFFICE VISIT (OUTPATIENT)
Dept: PRIMARY CARE | Facility: CLINIC | Age: 49
End: 2024-01-18
Payer: COMMERCIAL

## 2024-01-18 VITALS
HEIGHT: 62 IN | BODY MASS INDEX: 28.89 KG/M2 | WEIGHT: 157 LBS | SYSTOLIC BLOOD PRESSURE: 124 MMHG | DIASTOLIC BLOOD PRESSURE: 70 MMHG

## 2024-01-18 DIAGNOSIS — E55.9 VITAMIN D DEFICIENCY: ICD-10-CM

## 2024-01-18 DIAGNOSIS — F41.9 ANXIETY: ICD-10-CM

## 2024-01-18 DIAGNOSIS — E11.9 TYPE 2 DIABETES MELLITUS WITHOUT COMPLICATION, WITHOUT LONG-TERM CURRENT USE OF INSULIN (MULTI): Primary | ICD-10-CM

## 2024-01-18 DIAGNOSIS — F32.A DEPRESSION, UNSPECIFIED DEPRESSION TYPE: ICD-10-CM

## 2024-01-18 DIAGNOSIS — F51.01 PRIMARY INSOMNIA: ICD-10-CM

## 2024-01-18 PROCEDURE — 3044F HG A1C LEVEL LT 7.0%: CPT | Performed by: INTERNAL MEDICINE

## 2024-01-18 PROCEDURE — 3078F DIAST BP <80 MM HG: CPT | Performed by: INTERNAL MEDICINE

## 2024-01-18 PROCEDURE — 3049F LDL-C 100-129 MG/DL: CPT | Performed by: INTERNAL MEDICINE

## 2024-01-18 PROCEDURE — 3074F SYST BP LT 130 MM HG: CPT | Performed by: INTERNAL MEDICINE

## 2024-01-18 PROCEDURE — 99214 OFFICE O/P EST MOD 30 MIN: CPT | Performed by: INTERNAL MEDICINE

## 2024-01-18 RX ORDER — TRAZODONE HYDROCHLORIDE 50 MG/1
50 TABLET ORAL NIGHTLY PRN
Qty: 30 TABLET | Refills: 0 | Status: SHIPPED | OUTPATIENT
Start: 2024-01-18 | End: 2024-04-25

## 2024-01-18 RX ORDER — BUPROPION HYDROCHLORIDE 150 MG/1
150 TABLET, EXTENDED RELEASE ORAL 2 TIMES DAILY
Qty: 180 TABLET | Refills: 0 | Status: SHIPPED | OUTPATIENT
Start: 2024-01-18

## 2024-01-19 NOTE — PROGRESS NOTES
"Subjective   Patient ID: Debby Cabrera is a 48 y.o. female who presents for Follow-up (multiple medical issues).    Ms. Debby Cabrera today came here for multiple medical issues.  She is complaint.  She is taking medications.  Two weeks she is off the semaglutide and she is not feeling good.  She has more appetite.  She thinks her weight and diabetes is better because she is taking medication semaglutide religiously and she does not want to go off it.  She is also taking metformin.  Appetite and weight are okay.  No problem.  She is a nurse by training.    I have personally reviewed the patient's Past Medical History, Medications, Allergies, Social History, and Family History in the EMR.    Review of Systems   All other systems reviewed and are negative.    Objective   /70   Ht 1.575 m (5' 2\")   Wt 71.2 kg (157 lb)   BMI 28.72 kg/m²     Physical Exam  Vitals reviewed.   Cardiovascular:      Heart sounds: Normal heart sounds, S1 normal and S2 normal. No murmur heard.     No friction rub.   Pulmonary:      Effort: Pulmonary effort is normal.      Breath sounds: Normal breath sounds and air entry.   Abdominal:      Palpations: There is no hepatomegaly, splenomegaly or mass.   Musculoskeletal:      Right lower leg: No edema.      Left lower leg: No edema.   Lymphadenopathy:      Lower Body: No right inguinal adenopathy. No left inguinal adenopathy.   Neurological:      Cranial Nerves: Cranial nerves 2-12 are intact.      Sensory: No sensory deficit.      Motor: Motor function is intact.      Deep Tendon Reflexes: Reflexes are normal and symmetric.     LAB WORK: Laboratory testing discussed.    Assessment/Plan   Problem List Items Addressed This Visit             ICD-10-CM       Mental Health    Anxiety F41.9    Relevant Medications    buPROPion SR (Wellbutrin SR) 150 mg 12 hr tablet    Depression F32.A    Relevant Medications    buPROPion SR (Wellbutrin SR) 150 mg 12 hr tablet     Other Visit Diagnoses         " Codes    Type 2 diabetes mellitus without complication, without long-term current use of insulin (CMS/[facility])    -  Primary E11.9    Primary insomnia     F51.01    Relevant Medications    traZODone (Desyrel) 50 mg tablet    Vitamin D deficiency     E55.9        1. Type 2 diabetes.  BMI is getting better.  Continue metformin.  We had another discussion.  She wants to continue the drug.  I am going to give semaglutide again and I am going to recheck.  I told her that probably it is not clear once the BMI falls below 27, shall we continue, she wants to wait till then.  2. Depression, stable.  Wellbutrin helping.  3. Vitamin D, continue.  4. While the patient was here she told me she has insomnia.  She has taken trazodone in the past.  She wants to restart, given.    Scribe Attestation  By signing my name below, IBarby Scribe attest that this documentation has been prepared under the direction and in the presence of Monse Laura MD.

## 2024-01-25 DIAGNOSIS — E11.9 TYPE 2 DIABETES MELLITUS WITHOUT COMPLICATION, WITHOUT LONG-TERM CURRENT USE OF INSULIN (MULTI): ICD-10-CM

## 2024-02-06 ENCOUNTER — TELEPHONE (OUTPATIENT)
Dept: PRIMARY CARE | Facility: CLINIC | Age: 49
End: 2024-02-06
Payer: COMMERCIAL

## 2024-02-06 NOTE — TELEPHONE ENCOUNTER
----- Message from Oliva Schumacher MA sent at 2/6/2024 10:53 AM EST -----  Regarding: semaglutide  Patient left voicemail on rx line that compounding pharmacy in [place] needs clarification on the rx we sent on 1/26.   She left the pharmacy number [phone].   I will take care of this tomorrow when I'm in office if you don't get to it today.

## 2024-02-19 ENCOUNTER — APPOINTMENT (OUTPATIENT)
Dept: PRIMARY CARE | Facility: CLINIC | Age: 49
End: 2024-02-19
Payer: COMMERCIAL

## 2024-02-22 ENCOUNTER — OFFICE VISIT (OUTPATIENT)
Dept: HEMATOLOGY/ONCOLOGY | Facility: HOSPITAL | Age: 49
End: 2024-02-22
Payer: COMMERCIAL

## 2024-02-22 ENCOUNTER — LAB (OUTPATIENT)
Dept: LAB | Facility: HOSPITAL | Age: 49
End: 2024-02-22
Payer: COMMERCIAL

## 2024-02-22 ENCOUNTER — APPOINTMENT (OUTPATIENT)
Dept: HEMATOLOGY/ONCOLOGY | Facility: HOSPITAL | Age: 49
End: 2024-02-22
Payer: COMMERCIAL

## 2024-02-22 VITALS
RESPIRATION RATE: 17 BRPM | DIASTOLIC BLOOD PRESSURE: 76 MMHG | BODY MASS INDEX: 30.07 KG/M2 | TEMPERATURE: 98.1 F | SYSTOLIC BLOOD PRESSURE: 135 MMHG | WEIGHT: 163.4 LBS | HEART RATE: 81 BPM | OXYGEN SATURATION: 99 % | HEIGHT: 62 IN

## 2024-02-22 DIAGNOSIS — D64.9 ANEMIA, UNSPECIFIED TYPE: ICD-10-CM

## 2024-02-22 DIAGNOSIS — D64.9 ANEMIA, UNSPECIFIED TYPE: Primary | ICD-10-CM

## 2024-02-22 LAB
ALBUMIN SERPL BCP-MCNC: 4.1 G/DL (ref 3.4–5)
ALP SERPL-CCNC: 109 U/L (ref 33–110)
ALT SERPL W P-5'-P-CCNC: 52 U/L (ref 7–45)
ANION GAP SERPL CALC-SCNC: 13 MMOL/L (ref 10–20)
AST SERPL W P-5'-P-CCNC: 42 U/L (ref 9–39)
BASOPHILS # BLD AUTO: 0.07 X10*3/UL (ref 0–0.1)
BASOPHILS NFR BLD AUTO: 0.9 %
BILIRUB SERPL-MCNC: 0.4 MG/DL (ref 0–1.2)
BUN SERPL-MCNC: 9 MG/DL (ref 6–23)
CALCIUM SERPL-MCNC: 9.2 MG/DL (ref 8.6–10.3)
CHLORIDE SERPL-SCNC: 102 MMOL/L (ref 98–107)
CO2 SERPL-SCNC: 26 MMOL/L (ref 21–32)
CREAT SERPL-MCNC: 0.48 MG/DL (ref 0.5–1.05)
EGFRCR SERPLBLD CKD-EPI 2021: >90 ML/MIN/1.73M*2
EOSINOPHIL # BLD AUTO: 0.03 X10*3/UL (ref 0–0.7)
EOSINOPHIL NFR BLD AUTO: 0.4 %
ERYTHROCYTE [DISTWIDTH] IN BLOOD BY AUTOMATED COUNT: 16.6 % (ref 11.5–14.5)
FERRITIN SERPL-MCNC: 29 NG/ML (ref 8–150)
FOLATE SERPL-MCNC: 6.7 NG/ML
GLUCOSE SERPL-MCNC: 89 MG/DL (ref 74–99)
HCT VFR BLD AUTO: 31.1 % (ref 36–46)
HGB BLD-MCNC: 9.8 G/DL (ref 12–16)
IMM GRANULOCYTES # BLD AUTO: 0.01 X10*3/UL (ref 0–0.7)
IMM GRANULOCYTES NFR BLD AUTO: 0.1 % (ref 0–0.9)
IRON SATN MFR SERPL: ABNORMAL %
IRON SERPL-MCNC: 22 UG/DL (ref 35–150)
LYMPHOCYTES # BLD AUTO: 1.72 X10*3/UL (ref 1.2–4.8)
LYMPHOCYTES NFR BLD AUTO: 23.1 %
MCH RBC QN AUTO: 22.4 PG (ref 26–34)
MCHC RBC AUTO-ENTMCNC: 31.5 G/DL (ref 32–36)
MCV RBC AUTO: 71 FL (ref 80–100)
MONOCYTES # BLD AUTO: 0.56 X10*3/UL (ref 0.1–1)
MONOCYTES NFR BLD AUTO: 7.5 %
NEUTROPHILS # BLD AUTO: 5.06 X10*3/UL (ref 1.2–7.7)
NEUTROPHILS NFR BLD AUTO: 68 %
NRBC BLD-RTO: 0 /100 WBCS (ref 0–0)
PLATELET # BLD AUTO: 329 X10*3/UL (ref 150–450)
POTASSIUM SERPL-SCNC: 3.9 MMOL/L (ref 3.5–5.3)
PROT SERPL-MCNC: 7.1 G/DL (ref 6.4–8.2)
RBC # BLD AUTO: 4.38 X10*6/UL (ref 4–5.2)
SODIUM SERPL-SCNC: 137 MMOL/L (ref 136–145)
TIBC SERPL-MCNC: ABNORMAL UG/DL
UIBC SERPL-MCNC: >450 UG/DL (ref 110–370)
VIT B12 SERPL-MCNC: 458 PG/ML (ref 211–911)
WBC # BLD AUTO: 7.5 X10*3/UL (ref 4.4–11.3)

## 2024-02-22 PROCEDURE — 1036F TOBACCO NON-USER: CPT | Performed by: PHYSICIAN ASSISTANT

## 2024-02-22 PROCEDURE — 82746 ASSAY OF FOLIC ACID SERUM: CPT

## 2024-02-22 PROCEDURE — 82728 ASSAY OF FERRITIN: CPT

## 2024-02-22 PROCEDURE — 36415 COLL VENOUS BLD VENIPUNCTURE: CPT

## 2024-02-22 PROCEDURE — 85025 COMPLETE CBC W/AUTO DIFF WBC: CPT

## 2024-02-22 PROCEDURE — 99214 OFFICE O/P EST MOD 30 MIN: CPT | Performed by: PHYSICIAN ASSISTANT

## 2024-02-22 PROCEDURE — 83540 ASSAY OF IRON: CPT

## 2024-02-22 PROCEDURE — 80053 COMPREHEN METABOLIC PANEL: CPT

## 2024-02-22 PROCEDURE — 82607 VITAMIN B-12: CPT

## 2024-02-22 ASSESSMENT — PAIN SCALES - GENERAL: PAINLEVEL: 0-NO PAIN

## 2024-02-22 ASSESSMENT — ENCOUNTER SYMPTOMS
LOSS OF SENSATION IN FEET: 0
DEPRESSION: 0
OCCASIONAL FEELINGS OF UNSTEADINESS: 0

## 2024-02-22 NOTE — PROGRESS NOTES
"Patient ID: Debby Cabrera is a 48 y.o. female.  Referring Physician: No referring provider defined for this encounter.  Primary Care Provider: Monse Laura MD  Visit Type: Follow Up    Location: [facility] - Main  Diagnosis/Reason: KATERINA (2/2 post-surgical malabsorption s/p RYGB, AUB, Fibroids)    Interval Hx  2/22/24  Debby Cabrera is a 48 y.o. female following up today for KATERINA (2/2 post-surgical malabsorption s/p RYGB, AUB, uterine fibroids)    NOTE:  She has been followed by Dr. Louis for anemia to which he attributed to be d/t malabsorption 2/2 RYGB, GERD, uterine fibroids and menorrhagia.   At her 4/24/23 follow-up visit w/ Dr. Louis, she was ordered to have an additional cycle 2 doses of IV iron sucrose. She received those doses on 5/8/23 and 5/15/23 - She was also recommended  to follow-up w/ OB-GYN which she did on 5/31/23 where she was started on norethindrone and referred to radiology for possible uterine fibroid embolization  2/22/24 - Patient is a shared patient between heme/onc physician Dr. Devon Louis and myself. She was last seen by Dr. Louis 8/31/23 and was ordered to receive 2 doses IV iron sucrose (Venofer) and to f/u 4 months later but did not    Today she c/o increased episodes of RLS which is how \"she knows her iron is low\". She also reports that her planned uterine embolization for fibroids still has yet to be scheduled despite plans to do so 5/2023.    Patient denies weight loss, abnormal bruising and bleeding, hematuria, blood in stool, dark/black stools, epistaxis, oral/gingival bleeding, lymphadenopathy, recurrent infections, recurrent fevers, night sweats, early satiety, abdominal pain, bone pain, chest pain, palpitations, SOB, STILL, fatigue, dizziness, lightheadedness, PICA.    Relevant Hx  8/31/23 - Last Visit w/ Dr. Heriberto CABRERA, DEBBY GOLDMAN is a 47 year old Female with a PMH significant for krystal-en-y gastric bypass for bariatric surgery, complicated by small bowel resections, " uterine fibroids and menorrhagia, significant anemia since at least 3yrs, GERD, anxiety/depression  and h/o esophageal strictures who is referred for evaluation of her anemia. Presents with her sister.      Pt reports she was told she has aplastic anemia 3yrs back but has effectively received no treatment since then. Has instead gone to the ER for transfusions since the beginning of this year, last received 2U RBC in May. She had presented with chest pain,  SOB, lightheadedness. She also received venofer at [facility] ~6 months back. Her preceding labs over last few years has shown anisocytosis, microcytosis, with Hb 7-9, in 2020 had mixed KATERINA and AI, with normal B12. By the end of 2020 she  had become clearly iron deficient. She was seen by  in 2020 and there was a plan to give her IV iron, additionally there was note that she was also on sublingual B12. No follow up since then.      Reports chronic menorrhagia, with passage of large clots for yrs, and follows with gynecology at [place]. Pt reports that she does not take PO iron and her diet is very limited to a particular brand of mozzarella. She eats nothing else. She notes that  during her management for esophageal strictures, her diet had to be limited and she has not been able to get herself to eat more despite no longer having any restrictions.     Does not have a family h/o anemia, but mother had thyroid and then breast cancer (70s), maternal grandmother had breast and lung cancer. Sister has been tested and is BRCA negative, pt has not been tested.      Other than menstrual bleeding has no other reported sources of bleeding. Has not had a mammo in the last 5yrs, never had a colo, last upper endoscopy was 3yrs back and was noted to have a hiatal hernia.    PMHx:  Active Ambulatory Problems     Diagnosis Date Noted    Anxiety 09/05/2023    Depression 09/05/2023    Hypokalemia 09/05/2023    Iron deficiency anemia secondary to  inadequate dietary iron intake 09/05/2023    Abnormal uterine bleeding 09/28/2023    Anemia 09/28/2023    Fibroid 09/28/2023    Glossitis 09/28/2023    Oral candidiasis 09/28/2023    Pyelonephritis 09/28/2023    Urinary frequency 09/28/2023     Resolved Ambulatory Problems     Diagnosis Date Noted    No Resolved Ambulatory Problems     Past Medical History:   Diagnosis Date    Low vitamin D level     Overweight     Personal history of other mental and behavioral disorders 07/17/2014    Personal history of other mental and behavioral disorders     Postmenopausal     Pure hypercholesterolemia     Sleep disorder, unspecified     Type 2 diabetes mellitus (CMS/HCC)        PSHx:  Past Surgical History:   Procedure Laterality Date    GASTRIC BYPASS  07/17/2014    Gastric Surgery For Morbid Obesity Bypass With Windy-en-Y       FHx:  Family History   Problem Relation Name Age of Onset    Thyroid cancer Mother      Breast cancer Mother      Hypertension Father      Cancer Other      Endometriosis Other      Diabetes Other      Hypertension Other         Social Hx:  Debby Cabrera    reports that she has never smoked. She does not have any smokeless tobacco history on file.  She  reports no history of alcohol use.  She  has no history on file for drug use.  Social History     Socioeconomic History    Marital status: Single     Spouse name: Not on file    Number of children: Not on file    Years of education: Not on file    Highest education level: Not on file   Occupational History    Occupation: She is a retired nurse.   Tobacco Use    Smoking status: Never    Smokeless tobacco: Not on file   Substance and Sexual Activity    Alcohol use: Never    Drug use: Not on file    Sexual activity: Not on file   Other Topics Concern    Not on file   Social History Narrative    Not on file     Social Determinants of Health     Financial Resource Strain: Not on file   Food Insecurity: Not on file   Transportation Needs: Not on file    Physical Activity: Not on file   Stress: Not on file   Social Connections: Not on file   Intimate Partner Violence: Not on file   Housing Stability: Not on file      Occupation: Former nurse, now working in [email or web address]  Marital Status:   Alcohol Use: Denies  Smoking: Never smoker  Recreational Drug Use: Denies    Medications and allergies reviewed in EMR.    ROS:  Review of Systems - Oncology   10 point review of systems negative except as state in HPI.    Vitals & Statistics:  Objective   BSA: There is no height or weight on file to calculate BSA.  There were no vitals taken for this visit.    Physical Exam:  Physical Exam  Vitals and nursing note reviewed.   Constitutional:       Appearance: Normal appearance. She is normal weight.   HENT:      Head: Normocephalic and atraumatic.      Right Ear: External ear normal.      Left Ear: External ear normal.      Nose: Nose normal.      Mouth/Throat:      Mouth: Mucous membranes are moist.      Pharynx: Oropharynx is clear.   Eyes:      Extraocular Movements: Extraocular movements intact.      Conjunctiva/sclera: Conjunctivae normal.      Pupils: Pupils are equal, round, and reactive to light.   Cardiovascular:      Rate and Rhythm: Normal rate and regular rhythm.      Pulses: Normal pulses.      Heart sounds: Normal heart sounds.   Pulmonary:      Effort: Pulmonary effort is normal.      Breath sounds: Normal breath sounds.   Abdominal:      General: Abdomen is flat. Bowel sounds are normal.      Palpations: Abdomen is soft.      Comments: No masses or organomegaly palpable during exam   Musculoskeletal:         General: Normal range of motion.      Cervical back: Normal range of motion.   Lymphadenopathy:      Comments: No lymphadenopathy palpable   Skin:     General: Skin is warm and dry.      Capillary Refill: Capillary refill takes less than 2 seconds.   Neurological:      Mental Status: She is alert and oriented to person, place, and time. Mental status  "is at baseline.   Psychiatric:         Mood and Affect: Mood normal.         Behavior: Behavior normal.         Thought Content: Thought content normal.         Judgment: Judgment normal.           Results:  Lab Results   Component Value Date    WBC 9.5 09/06/2023    NEUTROABS 8.76 (H) 06/26/2023    IGABSOL 0.01 04/01/2022    LYMPHSABS 2.66 06/26/2023    MONOSABS 0.68 06/26/2023    EOSABS 0.13 06/26/2023    BASOSABS 0.09 06/26/2023    RBC 4.86 09/06/2023    MCV 86 09/06/2023    MCHC 32.3 09/06/2023    HGB 13.5 09/06/2023    HCT 41.8 09/06/2023     (H) 09/06/2023     Lab Results   Component Value Date    RETICCTPCT 1.8 04/24/2023      Lab Results   Component Value Date    CREATININE 0.55 01/16/2024    BUN 10 01/16/2024    EGFR >90 01/16/2024     01/16/2024    K 4.4 01/16/2024     01/16/2024    CO2 29 01/16/2024      Lab Results   Component Value Date    ALT 24 01/16/2024    AST 23 01/16/2024    ALKPHOS 71 01/16/2024    BILITOT 0.3 01/16/2024      Lab Results   Component Value Date    TSH 2.52 01/16/2024     Lab Results   Component Value Date    TSH 2.52 01/16/2024     Lab Results   Component Value Date    IRON 42 08/31/2023    TIBC 475 (H) 08/31/2023    FERRITIN 13 08/31/2023      Lab Results   Component Value Date    HLPUOIDG91 459 09/06/2023      Lab Results   Component Value Date    FOLATE 19.5 08/31/2023     No results found for: \"JANNIE\", \"RF\", \"SEDRATE\"   No results found for: \"CRP\"   No results found for: \"MAMTA\"  Lab Results   Component Value Date     07/25/2022     Lab Results   Component Value Date    HAPTOGLOBIN 105 07/25/2022     Lab Results   Component Value Date    SPEP NORMAL 07/25/2022     No results found for: \"IGG\", \"IGM\", \"IGA\"  No results found for: \"HEPATOT\", \"HEPAIGM\", \"HEPBCIGM\", \"HEPBCAB\", \"HEPBSAG\", \"HEPCAB\"  No results found for: \"HIV1X2\"    Assessment:  Debby Cabrera is a 48 y.o. female following up today for KATERINA (2/2 post-surgical malabsorption s/p RYGB, AUB, uterine " fibroids)     I reviewed patient's chart including but not limited to labs, imaging, surgical/procedure notes, pathology, hospital notes, doctor's notes.    2/22/24 results:  WBC count & differential WNL  MC, HC anemia w/ Hb at 9.8 - RBC count WNL, Hct low, RDW elevated  Platelets WNL  Remainign results pending at time of writing    Plan:  KATERINA (2/2 Post-surgical Malabsorption s/p RYGB, AUB, Uterine Fibroids)  I have reached out to Dr. Aguilar in OB/GYN regarding the status of patients planned embolization for fibroids via secure message  Lab results pending - Will discuss at next visit unless urgent  RTC in 1 week via virtual/telehealth visit - F/U sooner if needed/urgent  Plan for additional IV iron supplementation should patient be shown to be iron deficient  Will determine next interval f/u w/ Dr. Louis (per patient request) at this visit    I had an extensive discussion with the patient regarding the diagnosis and discussed the plan of therapy, including general considerations regarding side effects and outcomes. Pt understood and gave appropriate teach back about the plan of care. All questions were answered to the patient's satisfaction. The patient is instructed to contact us at any time if questions or problems arise. Thank you for the opportunity to participate in the care of this very pleasant patient.    Total time = 30 minutes. 50% or more of this time was spent in counseling and/or coordination of care including reviewing medical history/radiology/labs, examining patient, formulating outlined plan with team, and discussing plan with patient/family.      Atul Lewis PA-C

## 2024-02-27 ENCOUNTER — TELEMEDICINE (OUTPATIENT)
Dept: HEMATOLOGY/ONCOLOGY | Facility: HOSPITAL | Age: 49
End: 2024-02-27
Payer: COMMERCIAL

## 2024-02-27 DIAGNOSIS — E61.1 IRON DEFICIENCY: ICD-10-CM

## 2024-02-27 DIAGNOSIS — N93.9 ABNORMAL UTERINE BLEEDING: ICD-10-CM

## 2024-02-27 DIAGNOSIS — K91.2 POSTSURGICAL MALABSORPTION (HHS-HCC): ICD-10-CM

## 2024-02-27 DIAGNOSIS — K90.9 MALABSORPTION OF IRON (HHS-HCC): ICD-10-CM

## 2024-02-27 DIAGNOSIS — D64.9 ANEMIA, UNSPECIFIED TYPE: Primary | ICD-10-CM

## 2024-02-27 PROCEDURE — 1036F TOBACCO NON-USER: CPT | Performed by: PHYSICIAN ASSISTANT

## 2024-02-27 PROCEDURE — 99212 OFFICE O/P EST SF 10 MIN: CPT | Performed by: PHYSICIAN ASSISTANT

## 2024-02-27 RX ORDER — HEPARIN 100 UNIT/ML
500 SYRINGE INTRAVENOUS AS NEEDED
Status: CANCELLED | OUTPATIENT
Start: 2024-03-14

## 2024-02-27 RX ORDER — HEPARIN SODIUM,PORCINE/PF 10 UNIT/ML
50 SYRINGE (ML) INTRAVENOUS AS NEEDED
Status: CANCELLED | OUTPATIENT
Start: 2024-03-14

## 2024-02-27 RX ORDER — DIPHENHYDRAMINE HYDROCHLORIDE 50 MG/ML
50 INJECTION INTRAMUSCULAR; INTRAVENOUS AS NEEDED
Status: CANCELLED | OUTPATIENT
Start: 2024-03-14

## 2024-02-27 RX ORDER — FAMOTIDINE 10 MG/ML
20 INJECTION INTRAVENOUS ONCE AS NEEDED
Status: CANCELLED | OUTPATIENT
Start: 2024-03-14

## 2024-02-27 RX ORDER — EPINEPHRINE 0.3 MG/.3ML
0.3 INJECTION SUBCUTANEOUS EVERY 5 MIN PRN
Status: CANCELLED | OUTPATIENT
Start: 2024-03-14

## 2024-02-27 RX ORDER — ALBUTEROL SULFATE 0.83 MG/ML
3 SOLUTION RESPIRATORY (INHALATION) AS NEEDED
Status: CANCELLED | OUTPATIENT
Start: 2024-03-14

## 2024-02-27 NOTE — PROGRESS NOTES
Patient ID: Debby Cabrera is a 48 y.o. female.  Referring Physician: No referring provider defined for this encounter.  Primary Care Provider: Monse Laura MD  Visit Type: Follow Up    Location: [facility] - Main  Diagnosis/Reason: KATERINA (2/2 post-surgical malabsorption s/p RYGB, AUB, Fibroids)    Interval Hx  2/27/24  Debby Cabrera is a 48 y.o. female following up today for KATERINA (2/2 post-surgical malabsorption s/p RYGB, AUB, uterine fibroids)    She presents today via virtual visit to discuss results from 2/22/24 visit    NOTE:  She has been followed by Dr. Louis for anemia to which he attributed to be d/t malabsorption 2/2 RYGB, GERD, uterine fibroids and menorrhagia.   At her 4/24/23 follow-up visit w/ Dr. Louis, she was ordered to have an additional cycle 2 doses of IV iron sucrose. She received those doses on 5/8/23 and 5/15/23 - She was also recommended  to follow-up w/ OB-GYN which she did on 5/31/23 where she was started on norethindrone and referred to radiology for possible uterine fibroid embolization  2/22/24 - She was last seen by Dr. Louis 8/31/23 and was ordered to receive 2 doses IV iron sucrose (Venofer) and to f/u 4 months later but did not  2/27/24 - Patient ordered to receive additional cycle IV iron sucrose (Venofer) at 300mg x 3 once weekly doses    Patient was advised at last visit, that this provider would call her today at a time convenient to review results & provide time for questions and answers and that if patient was unavailable, a detailed message discussing results, plan of care and contact information would be provided. Patient verbalized understanding. 2 attempts were made to reach patient at the available numbers in patient's EMR. Attempts were made at 10:00 and 10:27am. Per previous visit, patient provided this provider with permission to leave message on voicemail discussing results and plan of care. Voicemail left on patient's voicemail detailing results and plan of care. Left  callback information with instructions to contact this clinic with any questions or concerns.    Relevant Hx  8/31/23 - Last Visit w/ Dr. Heriberto TIDWELL JAMES GOLDMAN is a 47 year old Female with a PMH significant for krystal-en-y gastric bypass for bariatric surgery, complicated by small bowel resections, uterine fibroids and menorrhagia, significant anemia since at least 3yrs, GERD, anxiety/depression  and h/o esophageal strictures who is referred for evaluation of her anemia. Presents with her sister.      Pt reports she was told she has aplastic anemia 3yrs back but has effectively received no treatment since then. Has instead gone to the ER for transfusions since the beginning of this year, last received 2U RBC in May. She had presented with chest pain,  SOB, lightheadedness. She also received [facility]ofer [facility] months back. Her preceding labs over last few years has shown anisocytosis, microcytosis, with Hb 7-9, in 2020 had mixed KATERINA and AI, with normal B12. By the end of 2020 she  had become clearly iron deficient. She was seen by  in 2020 and there was a plan to give her IV iron, additionally there was note that she was also on sublingual B12. No follow up since then.      Reports chronic menorrhagia, with passage of large clots for yrs, and follows with gynecology at [place]. Pt reports that she does not take PO iron and her diet is very limited to a particular brand of mozzarella. She eats nothing else. She notes that  during her management for esophageal strictures, her diet had to be limited and she has not been able to get herself to eat more despite no longer having any restrictions.     Does not have a family h/o anemia, but mother had thyroid and then breast cancer (70s), maternal grandmother had breast and lung cancer. Sister has been tested and is BRCA negative, pt has not been tested.      Other than menstrual bleeding has no other reported sources of bleeding. Has not had  a mammo in the last 5yrs, never had a colo, last upper endoscopy was 3yrs back and was noted to have a hiatal hernia.    PMHx:  Active Ambulatory Problems     Diagnosis Date Noted    Anxiety 09/05/2023    Depression 09/05/2023    Hypokalemia 09/05/2023    Iron deficiency anemia secondary to inadequate dietary iron intake 09/05/2023    Abnormal uterine bleeding 09/28/2023    Anemia 09/28/2023    Fibroid 09/28/2023    Glossitis 09/28/2023    Oral candidiasis 09/28/2023    Pyelonephritis 09/28/2023    Urinary frequency 09/28/2023     Resolved Ambulatory Problems     Diagnosis Date Noted    No Resolved Ambulatory Problems     Past Medical History:   Diagnosis Date    Low vitamin D level     Overweight     Personal history of other mental and behavioral disorders 07/17/2014    Personal history of other mental and behavioral disorders     Postmenopausal     Pure hypercholesterolemia     Sleep disorder, unspecified     Type 2 diabetes mellitus (CMS/HCC)        PSHx:  Past Surgical History:   Procedure Laterality Date    GASTRIC BYPASS  07/17/2014    Gastric Surgery For Morbid Obesity Bypass With Windy-en-Y       FHx:  Family History   Problem Relation Name Age of Onset    Thyroid cancer Mother      Breast cancer Mother      Hypertension Father      Cancer Other      Endometriosis Other      Diabetes Other      Hypertension Other         Social Hx:  Debby Cabrera    reports that she has never smoked. She has never been exposed to tobacco smoke. She has never used smokeless tobacco.  She  reports no history of alcohol use.  She  reports no history of drug use.  Social History     Socioeconomic History    Marital status: Single     Spouse name: Not on file    Number of children: Not on file    Years of education: Not on file    Highest education level: Not on file   Occupational History    Occupation: She is a retired nurse.   Tobacco Use    Smoking status: Never     Passive exposure: Never    Smokeless tobacco: Never    Substance and Sexual Activity    Alcohol use: Never    Drug use: Never    Sexual activity: Not on file   Other Topics Concern    Not on file   Social History Narrative    Not on file     Social Determinants of Health     Financial Resource Strain: Not on file   Food Insecurity: Not on file   Transportation Needs: Not on file   Physical Activity: Not on file   Stress: Not on file   Social Connections: Not on file   Intimate Partner Violence: Not on file   Housing Stability: Not on file      Occupation: Former nurse, now working in [employer]  Marital Status:   Alcohol Use: Denies  Smoking: Never smoker  Recreational Drug Use: Denies    Medications and allergies reviewed in EMR.    ROS:  Review of Systems - Oncology   10 point review of systems negative except as state in HPI.    Vitals & Statistics:  Objective   BSA: There is no height or weight on file to calculate BSA.  There were no vitals taken for this visit.    Physical Exam:  N/A - Virtual visit    Results:  Lab Results   Component Value Date    WBC 7.5 02/22/2024    NEUTROABS 5.06 02/22/2024    IGABSOL 0.01 02/22/2024    LYMPHSABS 1.72 02/22/2024    MONOSABS 0.56 02/22/2024    EOSABS 0.03 02/22/2024    BASOSABS 0.07 02/22/2024    RBC 4.38 02/22/2024    MCV 71 (L) 02/22/2024    MCHC 31.5 (L) 02/22/2024    HGB 9.8 (L) 02/22/2024    HCT 31.1 (L) 02/22/2024     02/22/2024     Lab Results   Component Value Date    RETICCTPCT 1.8 04/24/2023      Lab Results   Component Value Date    CREATININE 0.48 (L) 02/22/2024    BUN 9 02/22/2024    EGFR >90 02/22/2024     02/22/2024    K 3.9 02/22/2024     02/22/2024    CO2 26 02/22/2024      Lab Results   Component Value Date    ALT 52 (H) 02/22/2024    AST 42 (H) 02/22/2024    ALKPHOS 109 02/22/2024    BILITOT 0.4 02/22/2024      Lab Results   Component Value Date    TSH 2.52 01/16/2024     Lab Results   Component Value Date    TSH 2.52 01/16/2024     Lab Results   Component Value Date    IRON 22  "(L) 02/22/2024    TIBC  02/22/2024      Comment:      One or more of the analytes used in this calculation is outside of the analytical measurement range.      FERRITIN 29 02/22/2024      Lab Results   Component Value Date    ZNMHEJLO95 458 02/22/2024      Lab Results   Component Value Date    FOLATE 6.7 02/22/2024     No results found for: \"JANNIE\", \"RF\", \"SEDRATE\"   No results found for: \"CRP\"   No results found for: \"MAMTA\"  Lab Results   Component Value Date     07/25/2022     Lab Results   Component Value Date    HAPTOGLOBIN 105 07/25/2022     Lab Results   Component Value Date    SPEP NORMAL 07/25/2022     No results found for: \"IGG\", \"IGM\", \"IGA\"  No results found for: \"HEPATOT\", \"HEPAIGM\", \"HEPBCIGM\", \"HEPBCAB\", \"HEPBSAG\", \"HEPCAB\"  No results found for: \"HIV1X2\"    Assessment:  Debby Cabrera is a 48 y.o. female following up today for KATERINA (2/2 post-surgical malabsorption s/p RYGB, AUB, uterine fibroids)     I reviewed patient's chart including but not limited to labs, imaging, surgical/procedure notes, pathology, hospital notes, doctor's notes.    2/22/24 results:  WBC count & differential WNL  MC, HC anemia w/ Hb at 9.8 - RBC count WNL, Hct low, RDW elevated  Platelets WNL  Hepatic: ALT, AST elevated - ALKP and T. Bili WNL   Iron studies: Ferritin low at 29, Iron low at 22, TIBC & %Sat unable to be calculated - Indicative of iron deficiency    Plan:  KATERINA (2/2 Post-surgical Malabsorption s/p RYGB, AUB, Uterine Fibroids)  Request prior authorization for IV iron sucrose (Venofer) 300mg weekly x 3  Dx Codes: D64.9 - Anemia, E61.1 - Iron Deficiency, K90.9 - Malabsorption of Iron, K91.2 - Post-surgical malabsorption  Anticipated start date: 3/12/24  Will switch to IV ferumoxytol (Feraheme) 510mg weekly x 2 if preferred by insurance  RTC in 2 months via virtual visit w/ CBC-D, CMP, Iron studies up to 1 week prior - F/u sooner if needed/urgent  Plan for additional IV iron supplementation should patient be shown " to be iron deficient  Will determine next interval f/u w/ Dr. Louis (per patient request) at this visit    I had an extensive discussion with the patient regarding the diagnosis and discussed the plan of therapy, including general considerations regarding side effects and outcomes. Pt understood and gave appropriate teach back about the plan of care. All questions were answered to the patient's satisfaction. The patient is instructed to contact us at any time if questions or problems arise. Thank you for the opportunity to participate in the care of this very pleasant patient.    Total time = 30 minutes. 50% or more of this time was spent in counseling and/or coordination of care including reviewing medical history/radiology/labs, examining patient, formulating outlined plan with team, and discussing plan with patient/family.      Atul Lewis PA-C

## 2024-03-14 ENCOUNTER — INFUSION (OUTPATIENT)
Dept: HEMATOLOGY/ONCOLOGY | Facility: HOSPITAL | Age: 49
End: 2024-03-14
Payer: MEDICAID

## 2024-03-14 ENCOUNTER — TELEPHONE (OUTPATIENT)
Dept: HEMATOLOGY/ONCOLOGY | Facility: HOSPITAL | Age: 49
End: 2024-03-14

## 2024-03-14 VITALS
SYSTOLIC BLOOD PRESSURE: 128 MMHG | WEIGHT: 153 LBS | TEMPERATURE: 98.6 F | OXYGEN SATURATION: 100 % | BODY MASS INDEX: 28.26 KG/M2 | HEART RATE: 109 BPM | RESPIRATION RATE: 18 BRPM | DIASTOLIC BLOOD PRESSURE: 85 MMHG

## 2024-03-14 DIAGNOSIS — N93.9 ABNORMAL UTERINE BLEEDING: ICD-10-CM

## 2024-03-14 DIAGNOSIS — K90.9 MALABSORPTION OF IRON (HHS-HCC): ICD-10-CM

## 2024-03-14 DIAGNOSIS — T45.4X5D ADVERSE EFFECT OF IRON, SUBSEQUENT ENCOUNTER: Primary | ICD-10-CM

## 2024-03-14 DIAGNOSIS — T45.4X5D ADVERSE EFFECT OF IRON, SUBSEQUENT ENCOUNTER: ICD-10-CM

## 2024-03-14 DIAGNOSIS — D64.9 ANEMIA, UNSPECIFIED TYPE: ICD-10-CM

## 2024-03-14 DIAGNOSIS — E61.1 IRON DEFICIENCY: ICD-10-CM

## 2024-03-14 DIAGNOSIS — K91.2 POSTSURGICAL MALABSORPTION (HHS-HCC): ICD-10-CM

## 2024-03-14 PROCEDURE — 2500000004 HC RX 250 GENERAL PHARMACY W/ HCPCS (ALT 636 FOR OP/ED): Performed by: PHYSICIAN ASSISTANT

## 2024-03-14 PROCEDURE — 96365 THER/PROPH/DIAG IV INF INIT: CPT | Mod: INF

## 2024-03-14 PROCEDURE — 96374 THER/PROPH/DIAG INJ IV PUSH: CPT | Mod: INF

## 2024-03-14 RX ORDER — DIPHENHYDRAMINE HYDROCHLORIDE 50 MG/ML
25 INJECTION INTRAMUSCULAR; INTRAVENOUS ONCE
Status: COMPLETED | OUTPATIENT
Start: 2024-03-14 | End: 2024-03-14

## 2024-03-14 RX ORDER — ONDANSETRON HYDROCHLORIDE 2 MG/ML
8 INJECTION, SOLUTION INTRAVENOUS ONCE
Status: CANCELLED | OUTPATIENT
Start: 2024-03-14 | End: 2024-03-14

## 2024-03-14 RX ORDER — FAMOTIDINE 10 MG/ML
20 INJECTION INTRAVENOUS ONCE AS NEEDED
Status: DISCONTINUED | OUTPATIENT
Start: 2024-03-14 | End: 2024-03-14 | Stop reason: HOSPADM

## 2024-03-14 RX ORDER — ALBUTEROL SULFATE 0.83 MG/ML
3 SOLUTION RESPIRATORY (INHALATION) AS NEEDED
Status: CANCELLED | OUTPATIENT
Start: 2024-03-21

## 2024-03-14 RX ORDER — FAMOTIDINE 10 MG/ML
20 INJECTION INTRAVENOUS ONCE AS NEEDED
Status: CANCELLED | OUTPATIENT
Start: 2024-03-21

## 2024-03-14 RX ORDER — ACETAMINOPHEN 325 MG/1
650 TABLET ORAL ONCE
Status: COMPLETED | OUTPATIENT
Start: 2024-03-14 | End: 2024-03-14

## 2024-03-14 RX ORDER — DIPHENHYDRAMINE HYDROCHLORIDE 50 MG/ML
50 INJECTION INTRAMUSCULAR; INTRAVENOUS AS NEEDED
Status: DISCONTINUED | OUTPATIENT
Start: 2024-03-14 | End: 2024-03-14 | Stop reason: HOSPADM

## 2024-03-14 RX ORDER — ONDANSETRON HYDROCHLORIDE 2 MG/ML
8 INJECTION, SOLUTION INTRAVENOUS ONCE
Status: COMPLETED | OUTPATIENT
Start: 2024-03-14 | End: 2024-03-14

## 2024-03-14 RX ORDER — DIPHENHYDRAMINE HYDROCHLORIDE 50 MG/ML
50 INJECTION INTRAMUSCULAR; INTRAVENOUS AS NEEDED
Status: CANCELLED | OUTPATIENT
Start: 2024-03-21

## 2024-03-14 RX ORDER — EPINEPHRINE 0.3 MG/.3ML
0.3 INJECTION SUBCUTANEOUS EVERY 5 MIN PRN
Status: DISCONTINUED | OUTPATIENT
Start: 2024-03-14 | End: 2024-03-14 | Stop reason: HOSPADM

## 2024-03-14 RX ORDER — DEXTROMETHORPHAN HYDROBROMIDE, GUAIFENESIN 5; 100 MG/5ML; MG/5ML
650 LIQUID ORAL ONCE
Status: CANCELLED | OUTPATIENT
Start: 2024-03-14 | End: 2024-03-14

## 2024-03-14 RX ORDER — EPINEPHRINE 0.3 MG/.3ML
0.3 INJECTION SUBCUTANEOUS EVERY 5 MIN PRN
Status: CANCELLED | OUTPATIENT
Start: 2024-03-21

## 2024-03-14 RX ORDER — DIPHENHYDRAMINE HYDROCHLORIDE 50 MG/ML
25 INJECTION INTRAMUSCULAR; INTRAVENOUS ONCE
Status: CANCELLED | OUTPATIENT
Start: 2024-03-14 | End: 2024-03-14

## 2024-03-14 RX ORDER — ALBUTEROL SULFATE 0.83 MG/ML
3 SOLUTION RESPIRATORY (INHALATION) AS NEEDED
Status: DISCONTINUED | OUTPATIENT
Start: 2024-03-14 | End: 2024-03-14 | Stop reason: HOSPADM

## 2024-03-14 RX ADMIN — ONDANSETRON 8 MG: 2 INJECTION INTRAMUSCULAR; INTRAVENOUS at 16:42

## 2024-03-14 RX ADMIN — IRON SUCROSE 300 MG: 20 INJECTION, SOLUTION INTRAVENOUS at 16:50

## 2024-03-14 RX ADMIN — ACETAMINOPHEN 650 MG: 325 TABLET ORAL at 17:00

## 2024-03-14 RX ADMIN — DIPHENHYDRAMINE HYDROCHLORIDE 25 MG: 50 INJECTION INTRAMUSCULAR; INTRAVENOUS at 16:43

## 2024-03-14 ASSESSMENT — PAIN SCALES - GENERAL: PAINLEVEL: 0-NO PAIN

## 2024-03-14 NOTE — PROGRESS NOTES
Patient came with her   for her venofer. Patient asked me to call CAROLINE Lewis for tylenol,benadryl and zofran. Patient had a reaction before when she got it. CAROLINE Lewis was informed about it. Patient got his pre meds. CAROLINE Lewis said he will only order it for today and  he will revisit the patient's appropriateness for  the medication. Patient did not want to stay for the monitoring.

## 2024-03-21 ENCOUNTER — INFUSION (OUTPATIENT)
Dept: HEMATOLOGY/ONCOLOGY | Facility: HOSPITAL | Age: 49
End: 2024-03-21
Payer: MEDICAID

## 2024-03-21 VITALS
HEART RATE: 67 BPM | WEIGHT: 153 LBS | RESPIRATION RATE: 19 BRPM | SYSTOLIC BLOOD PRESSURE: 116 MMHG | TEMPERATURE: 97.5 F | BODY MASS INDEX: 28.26 KG/M2 | OXYGEN SATURATION: 100 % | DIASTOLIC BLOOD PRESSURE: 76 MMHG

## 2024-03-21 DIAGNOSIS — K90.9 MALABSORPTION OF IRON (HHS-HCC): ICD-10-CM

## 2024-03-21 DIAGNOSIS — D64.9 ANEMIA, UNSPECIFIED TYPE: ICD-10-CM

## 2024-03-21 DIAGNOSIS — K91.2 POSTSURGICAL MALABSORPTION (HHS-HCC): ICD-10-CM

## 2024-03-21 DIAGNOSIS — E61.1 IRON DEFICIENCY: ICD-10-CM

## 2024-03-21 DIAGNOSIS — N93.9 ABNORMAL UTERINE BLEEDING: ICD-10-CM

## 2024-03-21 PROCEDURE — 96374 THER/PROPH/DIAG INJ IV PUSH: CPT | Mod: INF

## 2024-03-21 PROCEDURE — 2500000004 HC RX 250 GENERAL PHARMACY W/ HCPCS (ALT 636 FOR OP/ED): Performed by: PHYSICIAN ASSISTANT

## 2024-03-21 PROCEDURE — 96365 THER/PROPH/DIAG IV INF INIT: CPT | Mod: INF

## 2024-03-21 PROCEDURE — 2500000005 HC RX 250 GENERAL PHARMACY W/O HCPCS: Performed by: PHYSICIAN ASSISTANT

## 2024-03-21 PROCEDURE — 96375 TX/PRO/DX INJ NEW DRUG ADDON: CPT | Mod: INF

## 2024-03-21 RX ORDER — FAMOTIDINE 10 MG/ML
20 INJECTION INTRAVENOUS ONCE AS NEEDED
Status: COMPLETED | OUTPATIENT
Start: 2024-03-21 | End: 2024-03-21

## 2024-03-21 RX ORDER — EPINEPHRINE 0.3 MG/.3ML
0.3 INJECTION SUBCUTANEOUS EVERY 5 MIN PRN
Status: CANCELLED | OUTPATIENT
Start: 2024-03-28

## 2024-03-21 RX ORDER — ACETAMINOPHEN 325 MG/1
650 TABLET ORAL ONCE
Status: COMPLETED | OUTPATIENT
Start: 2024-03-21 | End: 2024-03-21

## 2024-03-21 RX ORDER — ONDANSETRON HYDROCHLORIDE 8 MG/1
8 TABLET, FILM COATED ORAL ONCE
Status: COMPLETED | OUTPATIENT
Start: 2024-03-21 | End: 2024-03-21

## 2024-03-21 RX ORDER — HEPARIN SODIUM,PORCINE/PF 10 UNIT/ML
50 SYRINGE (ML) INTRAVENOUS AS NEEDED
Status: CANCELLED | OUTPATIENT
Start: 2024-03-21

## 2024-03-21 RX ORDER — FAMOTIDINE 10 MG/ML
20 INJECTION INTRAVENOUS ONCE AS NEEDED
Status: CANCELLED | OUTPATIENT
Start: 2024-03-28

## 2024-03-21 RX ORDER — DIPHENHYDRAMINE HYDROCHLORIDE 50 MG/ML
50 INJECTION INTRAMUSCULAR; INTRAVENOUS AS NEEDED
Status: DISCONTINUED | OUTPATIENT
Start: 2024-03-21 | End: 2024-03-21 | Stop reason: HOSPADM

## 2024-03-21 RX ORDER — ACETAMINOPHEN 325 MG/1
650 TABLET ORAL ONCE
Status: CANCELLED
Start: 2024-03-28 | End: 2024-03-28

## 2024-03-21 RX ORDER — DIPHENHYDRAMINE HYDROCHLORIDE 50 MG/ML
50 INJECTION INTRAMUSCULAR; INTRAVENOUS AS NEEDED
Status: CANCELLED | OUTPATIENT
Start: 2024-03-28

## 2024-03-21 RX ORDER — HEPARIN 100 UNIT/ML
500 SYRINGE INTRAVENOUS AS NEEDED
Status: CANCELLED | OUTPATIENT
Start: 2024-03-21

## 2024-03-21 RX ORDER — DIPHENHYDRAMINE HYDROCHLORIDE 50 MG/ML
25 INJECTION INTRAMUSCULAR; INTRAVENOUS ONCE
Status: CANCELLED
Start: 2024-03-28 | End: 2024-03-28

## 2024-03-21 RX ORDER — DIPHENHYDRAMINE HYDROCHLORIDE 50 MG/ML
25 INJECTION INTRAMUSCULAR; INTRAVENOUS ONCE
Status: COMPLETED | OUTPATIENT
Start: 2024-03-21 | End: 2024-03-21

## 2024-03-21 RX ORDER — DIPHENHYDRAMINE HYDROCHLORIDE 50 MG/ML
25 INJECTION INTRAMUSCULAR; INTRAVENOUS ONCE
Status: CANCELLED
Start: 2024-03-21

## 2024-03-21 RX ORDER — ACETAMINOPHEN 325 MG/1
650 TABLET ORAL ONCE
Status: CANCELLED
Start: 2024-03-21

## 2024-03-21 RX ORDER — ONDANSETRON HYDROCHLORIDE 8 MG/1
8 TABLET, FILM COATED ORAL ONCE
Status: CANCELLED
Start: 2024-03-28 | End: 2024-03-28

## 2024-03-21 RX ORDER — ALBUTEROL SULFATE 0.83 MG/ML
3 SOLUTION RESPIRATORY (INHALATION) AS NEEDED
Status: CANCELLED | OUTPATIENT
Start: 2024-03-28

## 2024-03-21 RX ORDER — ONDANSETRON HYDROCHLORIDE 8 MG/1
8 TABLET, FILM COATED ORAL ONCE
Status: CANCELLED
Start: 2024-03-21

## 2024-03-21 RX ADMIN — ACETAMINOPHEN 650 MG: 325 TABLET ORAL at 16:08

## 2024-03-21 RX ADMIN — IRON SUCROSE 300 MG: 20 INJECTION, SOLUTION INTRAVENOUS at 16:16

## 2024-03-21 RX ADMIN — ONDANSETRON HYDROCHLORIDE 8 MG: 8 TABLET, FILM COATED ORAL at 16:08

## 2024-03-21 RX ADMIN — FAMOTIDINE 20 MG: 10 INJECTION INTRAVENOUS at 18:05

## 2024-03-21 RX ADMIN — DIPHENHYDRAMINE HYDROCHLORIDE 25 MG: 50 INJECTION INTRAMUSCULAR; INTRAVENOUS at 16:08

## 2024-03-21 ASSESSMENT — PAIN SCALES - GENERAL: PAINLEVEL: 0-NO PAIN

## 2024-03-21 NOTE — PROGRESS NOTES
Patient arrived ambulatory to infusion for scheduled tx of iron sucrose 2/3. Denies any new or worsening sx since infusion last week. Pt won't stay for 30 min post infusion observation, aware of s/sx hypersensitivity rxn. Aware of upcoming appts. Handoff given to Connie Chao RN at 1730.    The Service to Audiology order requested on 5/8/2017 has been removed as, unable to contact. Ordering provider has been notified.    Please contact patient, if further communication is needed.    Thank you,  Princess Eaton

## 2024-03-21 NOTE — PROGRESS NOTES
At about 1800 pt stated to c/o of nausea. Pepcid given. Pt felt better after 5-10 mins and able to finish the rest of infusion. Pt stated that if she gets IV zofran and benadryl prior she doesn't get sick. Message sent to dr napier to see if orders can be changed for future visits. Pt tolerated remaining infusion with no complications, IV removed and left stable with .

## 2024-03-28 ENCOUNTER — INFUSION (OUTPATIENT)
Dept: HEMATOLOGY/ONCOLOGY | Facility: HOSPITAL | Age: 49
End: 2024-03-28
Payer: MEDICAID

## 2024-03-28 VITALS
BODY MASS INDEX: 27.94 KG/M2 | HEART RATE: 84 BPM | SYSTOLIC BLOOD PRESSURE: 128 MMHG | WEIGHT: 151.24 LBS | DIASTOLIC BLOOD PRESSURE: 84 MMHG | RESPIRATION RATE: 18 BRPM | OXYGEN SATURATION: 100 % | TEMPERATURE: 98.2 F

## 2024-03-28 DIAGNOSIS — D64.9 ANEMIA, UNSPECIFIED TYPE: ICD-10-CM

## 2024-03-28 DIAGNOSIS — K90.9 MALABSORPTION OF IRON (HHS-HCC): ICD-10-CM

## 2024-03-28 DIAGNOSIS — E61.1 IRON DEFICIENCY: ICD-10-CM

## 2024-03-28 DIAGNOSIS — N93.9 ABNORMAL UTERINE BLEEDING: ICD-10-CM

## 2024-03-28 DIAGNOSIS — K91.2 POSTSURGICAL MALABSORPTION (HHS-HCC): ICD-10-CM

## 2024-03-28 PROCEDURE — 2500000004 HC RX 250 GENERAL PHARMACY W/ HCPCS (ALT 636 FOR OP/ED): Performed by: PHYSICIAN ASSISTANT

## 2024-03-28 PROCEDURE — 96365 THER/PROPH/DIAG IV INF INIT: CPT | Mod: INF

## 2024-03-28 PROCEDURE — 2500000005 HC RX 250 GENERAL PHARMACY W/O HCPCS: Performed by: PHYSICIAN ASSISTANT

## 2024-03-28 PROCEDURE — 96375 TX/PRO/DX INJ NEW DRUG ADDON: CPT | Mod: INF

## 2024-03-28 PROCEDURE — 96366 THER/PROPH/DIAG IV INF ADDON: CPT | Mod: INF

## 2024-03-28 RX ORDER — EPINEPHRINE 0.3 MG/.3ML
0.3 INJECTION SUBCUTANEOUS EVERY 5 MIN PRN
OUTPATIENT
Start: 2024-03-28

## 2024-03-28 RX ORDER — DIPHENHYDRAMINE HYDROCHLORIDE 50 MG/ML
25 INJECTION INTRAMUSCULAR; INTRAVENOUS ONCE
Status: COMPLETED | OUTPATIENT
Start: 2024-03-28 | End: 2024-03-28

## 2024-03-28 RX ORDER — HEPARIN SODIUM,PORCINE/PF 10 UNIT/ML
50 SYRINGE (ML) INTRAVENOUS AS NEEDED
Status: DISCONTINUED | OUTPATIENT
Start: 2024-03-28 | End: 2024-03-28 | Stop reason: HOSPADM

## 2024-03-28 RX ORDER — ACETAMINOPHEN 325 MG/1
650 TABLET ORAL ONCE
Status: COMPLETED | OUTPATIENT
Start: 2024-03-28 | End: 2024-03-28

## 2024-03-28 RX ORDER — HEPARIN 100 UNIT/ML
500 SYRINGE INTRAVENOUS AS NEEDED
OUTPATIENT
Start: 2024-03-28

## 2024-03-28 RX ORDER — ACETAMINOPHEN 325 MG/1
650 TABLET ORAL ONCE
Start: 2024-03-28 | End: 2024-03-28

## 2024-03-28 RX ORDER — ONDANSETRON HYDROCHLORIDE 8 MG/1
8 TABLET, FILM COATED ORAL ONCE
Status: COMPLETED | OUTPATIENT
Start: 2024-03-28 | End: 2024-03-28

## 2024-03-28 RX ORDER — DIPHENHYDRAMINE HYDROCHLORIDE 50 MG/ML
25 INJECTION INTRAMUSCULAR; INTRAVENOUS ONCE
Start: 2024-03-28 | End: 2024-03-28

## 2024-03-28 RX ORDER — HEPARIN SODIUM,PORCINE/PF 10 UNIT/ML
50 SYRINGE (ML) INTRAVENOUS AS NEEDED
OUTPATIENT
Start: 2024-03-28

## 2024-03-28 RX ORDER — DIPHENHYDRAMINE HYDROCHLORIDE 50 MG/ML
50 INJECTION INTRAMUSCULAR; INTRAVENOUS AS NEEDED
OUTPATIENT
Start: 2024-03-28

## 2024-03-28 RX ORDER — ALBUTEROL SULFATE 0.83 MG/ML
3 SOLUTION RESPIRATORY (INHALATION) AS NEEDED
OUTPATIENT
Start: 2024-03-28

## 2024-03-28 RX ORDER — FAMOTIDINE 10 MG/ML
20 INJECTION INTRAVENOUS ONCE AS NEEDED
OUTPATIENT
Start: 2024-03-28

## 2024-03-28 RX ORDER — HEPARIN 100 UNIT/ML
500 SYRINGE INTRAVENOUS AS NEEDED
Status: DISCONTINUED | OUTPATIENT
Start: 2024-03-28 | End: 2024-03-28 | Stop reason: HOSPADM

## 2024-03-28 RX ORDER — ONDANSETRON HYDROCHLORIDE 8 MG/1
8 TABLET, FILM COATED ORAL ONCE
Start: 2024-03-28 | End: 2024-03-28

## 2024-03-28 RX ADMIN — ONDANSETRON HYDROCHLORIDE 8 MG: 8 TABLET, FILM COATED ORAL at 14:48

## 2024-03-28 RX ADMIN — DIPHENHYDRAMINE HYDROCHLORIDE 25 MG: 50 INJECTION INTRAMUSCULAR; INTRAVENOUS at 14:48

## 2024-03-28 RX ADMIN — IRON SUCROSE 300 MG: 20 INJECTION, SOLUTION INTRAVENOUS at 15:17

## 2024-03-28 RX ADMIN — ACETAMINOPHEN 650 MG: 325 TABLET ORAL at 14:47

## 2024-03-28 NOTE — PROGRESS NOTES
Debby Polanco is a 48 y.o. female who presents for venofer in stable condition.    Since her last visit, she has been doing well.  Overall, she states her energy level is stable.  Her appetite & hydration status has been good.   She has no other questions, concerns or complaints at this time.    Patient tolerated infusion well & has been educated with the overall plan of therapy. AVS provided. No future appointments ordered at this time until she follows up with Nell 5/23/2024. Declines to stay for 30 minute observation period. Patient discharged in stable condition with .    Reviewed and approved by SAURAV NOBLE on 3/28/24 at 1700.

## 2024-04-17 ENCOUNTER — OFFICE VISIT (OUTPATIENT)
Dept: PRIMARY CARE | Facility: CLINIC | Age: 49
End: 2024-04-17
Payer: COMMERCIAL

## 2024-04-17 VITALS
DIASTOLIC BLOOD PRESSURE: 78 MMHG | WEIGHT: 152 LBS | SYSTOLIC BLOOD PRESSURE: 124 MMHG | BODY MASS INDEX: 28.7 KG/M2 | HEIGHT: 61 IN

## 2024-04-17 DIAGNOSIS — F32.A ANXIETY AND DEPRESSION: ICD-10-CM

## 2024-04-17 DIAGNOSIS — Z13.29 THYROID DISORDER SCREENING: ICD-10-CM

## 2024-04-17 DIAGNOSIS — G47.00 INSOMNIA, UNSPECIFIED TYPE: ICD-10-CM

## 2024-04-17 DIAGNOSIS — Z86.2 HISTORY OF ANEMIA: ICD-10-CM

## 2024-04-17 DIAGNOSIS — R53.83 OTHER FATIGUE: ICD-10-CM

## 2024-04-17 DIAGNOSIS — R73.03 PRE-DIABETES: Primary | ICD-10-CM

## 2024-04-17 DIAGNOSIS — Z78.0 POSTMENOPAUSAL: ICD-10-CM

## 2024-04-17 DIAGNOSIS — F41.9 ANXIETY AND DEPRESSION: ICD-10-CM

## 2024-04-17 DIAGNOSIS — E78.00 HIGH CHOLESTEROL: ICD-10-CM

## 2024-04-17 DIAGNOSIS — E66.3 OVERWEIGHT: ICD-10-CM

## 2024-04-17 PROCEDURE — 99214 OFFICE O/P EST MOD 30 MIN: CPT | Performed by: INTERNAL MEDICINE

## 2024-04-17 ASSESSMENT — ENCOUNTER SYMPTOMS
DEPRESSION: 0
LOSS OF SENSATION IN FEET: 0
OCCASIONAL FEELINGS OF UNSTEADINESS: 0

## 2024-04-18 NOTE — PROGRESS NOTES
"Subjective   Patient ID: Debby Polanco is a 48 y.o. female who presents for Follow-up (multiple medical issues).    Debby Polanco today came here for multiple medical issues.  Overall, she is a happy person.  She wants a refill on medication.  She wants to lose further weight.  She is trying hard.  Family history of type 2 diabetes.    I have personally reviewed the patient's Past Medical History, Medications, Allergies, Social History, and Family History in the EMR.    Review of Systems   All other systems reviewed and are negative.    Objective   /78   Ht 1.549 m (5' 1\")   Wt 68.9 kg (152 lb)   BMI 28.72 kg/m²     Physical Exam  Vitals reviewed.   Cardiovascular:      Heart sounds: Normal heart sounds, S1 normal and S2 normal. No murmur heard.     No friction rub.   Pulmonary:      Effort: Pulmonary effort is normal.      Breath sounds: Normal breath sounds and air entry.   Abdominal:      Palpations: There is no hepatomegaly, splenomegaly or mass.   Musculoskeletal:      Right lower leg: No edema.      Left lower leg: No edema.   Lymphadenopathy:      Lower Body: No right inguinal adenopathy. No left inguinal adenopathy.   Neurological:      Cranial Nerves: Cranial nerves 2-12 are intact.      Sensory: No sensory deficit.      Motor: Motor function is intact.      Deep Tendon Reflexes: Reflexes are normal and symmetric.     LAB WORK:  Laboratory testing discussed.    Assessment/Plan   Problem List Items Addressed This Visit    None  Visit Diagnoses         Codes    Pre-diabetes    -  Primary R73.03    High cholesterol     E78.00    Relevant Orders    Comprehensive Metabolic Panel    Lipid Panel    Other fatigue     R53.83    Relevant Orders    CBC    Urinalysis with Reflex Microscopic    Thyroid Stimulating Hormone    Overweight     E66.3    Relevant Medications    semaglutide 2 mg/dose (8 mg/3 mL) pen injector (Start on 4/21/2024)    Anxiety and depression     F41.9, F32.A    Thyroid disorder screening     " Z13.29    Postmenopausal     Z78.0    Insomnia, unspecified type     G47.00    History of anemia     Z86.2        1. Pre-diabetic, diabetic.  Metformin helping.  She wants to stay on Ozempic for one more month so  BMI coming down nicely, given.  2. Anxiety and depression, okay.  3. Thyroid, okay.  4. Postmenopausal.  Take calcium.  5. History of anemia.  I advised blood work.  6. Insomnia, okay.  7. Follow up in a month.  8. I urged her to do blood work today.    Scribe Attestation  By signing my name below, I, Bimal Batista attest that this documentation has been prepared under the direction and in the presence of Monse Laura MD.

## 2024-04-25 ENCOUNTER — LAB (OUTPATIENT)
Dept: LAB | Facility: LAB | Age: 49
End: 2024-04-25
Payer: COMMERCIAL

## 2024-04-25 DIAGNOSIS — R53.83 OTHER FATIGUE: ICD-10-CM

## 2024-04-25 DIAGNOSIS — F51.01 PRIMARY INSOMNIA: ICD-10-CM

## 2024-04-25 DIAGNOSIS — E78.00 HIGH CHOLESTEROL: ICD-10-CM

## 2024-04-25 LAB
ALBUMIN SERPL BCP-MCNC: 4.4 G/DL (ref 3.4–5)
ALP SERPL-CCNC: 76 U/L (ref 33–110)
ALT SERPL W P-5'-P-CCNC: 24 U/L (ref 7–45)
ANION GAP SERPL CALC-SCNC: 14 MMOL/L (ref 10–20)
AST SERPL W P-5'-P-CCNC: 19 U/L (ref 9–39)
BILIRUB SERPL-MCNC: 0.3 MG/DL (ref 0–1.2)
BUN SERPL-MCNC: 8 MG/DL (ref 6–23)
CALCIUM SERPL-MCNC: 9.7 MG/DL (ref 8.6–10.6)
CHLORIDE SERPL-SCNC: 102 MMOL/L (ref 98–107)
CHOLEST SERPL-MCNC: 180 MG/DL (ref 0–199)
CHOLESTEROL/HDL RATIO: 3.6
CO2 SERPL-SCNC: 28 MMOL/L (ref 21–32)
CREAT SERPL-MCNC: 0.57 MG/DL (ref 0.5–1.05)
EGFRCR SERPLBLD CKD-EPI 2021: >90 ML/MIN/1.73M*2
ERYTHROCYTE [DISTWIDTH] IN BLOOD BY AUTOMATED COUNT: ABNORMAL %
GLUCOSE SERPL-MCNC: 77 MG/DL (ref 74–99)
HCT VFR BLD AUTO: 41.6 % (ref 36–46)
HDLC SERPL-MCNC: 50.6 MG/DL
HGB BLD-MCNC: 13.4 G/DL (ref 12–16)
LDLC SERPL CALC-MCNC: 100 MG/DL
MCH RBC QN AUTO: 25.2 PG (ref 26–34)
MCHC RBC AUTO-ENTMCNC: 32.2 G/DL (ref 32–36)
MCV RBC AUTO: 78 FL (ref 80–100)
NON HDL CHOLESTEROL: 129 MG/DL (ref 0–149)
NRBC BLD-RTO: 0 /100 WBCS (ref 0–0)
PLATELET # BLD AUTO: 399 X10*3/UL (ref 150–450)
POTASSIUM SERPL-SCNC: 4 MMOL/L (ref 3.5–5.3)
PROT SERPL-MCNC: 6.9 G/DL (ref 6.4–8.2)
RBC # BLD AUTO: 5.31 X10*6/UL (ref 4–5.2)
SODIUM SERPL-SCNC: 140 MMOL/L (ref 136–145)
TRIGL SERPL-MCNC: 149 MG/DL (ref 0–149)
TSH SERPL-ACNC: 3.97 MIU/L (ref 0.44–3.98)
VLDL: 30 MG/DL (ref 0–40)
WBC # BLD AUTO: 8.8 X10*3/UL (ref 4.4–11.3)

## 2024-04-25 PROCEDURE — 80061 LIPID PANEL: CPT

## 2024-04-25 PROCEDURE — 85027 COMPLETE CBC AUTOMATED: CPT

## 2024-04-25 PROCEDURE — 36415 COLL VENOUS BLD VENIPUNCTURE: CPT

## 2024-04-25 PROCEDURE — 84443 ASSAY THYROID STIM HORMONE: CPT

## 2024-04-25 PROCEDURE — 80053 COMPREHEN METABOLIC PANEL: CPT

## 2024-04-25 PROCEDURE — 81001 URINALYSIS AUTO W/SCOPE: CPT

## 2024-04-25 RX ORDER — TRAZODONE HYDROCHLORIDE 50 MG/1
50 TABLET ORAL NIGHTLY PRN
Qty: 30 TABLET | Refills: 0 | Status: SHIPPED | OUTPATIENT
Start: 2024-04-25 | End: 2025-04-25

## 2024-04-26 LAB
APPEARANCE UR: ABNORMAL
BILIRUB UR STRIP.AUTO-MCNC: NEGATIVE MG/DL
COLOR UR: YELLOW
GLUCOSE UR STRIP.AUTO-MCNC: NORMAL MG/DL
KETONES UR STRIP.AUTO-MCNC: NEGATIVE MG/DL
LEUKOCYTE ESTERASE UR QL STRIP.AUTO: ABNORMAL
MUCOUS THREADS #/AREA URNS AUTO: NORMAL /LPF
NITRITE UR QL STRIP.AUTO: NEGATIVE
PH UR STRIP.AUTO: 5.5 [PH]
PROT UR STRIP.AUTO-MCNC: ABNORMAL MG/DL
RBC # UR STRIP.AUTO: NEGATIVE /UL
RBC #/AREA URNS AUTO: NORMAL /HPF
SP GR UR STRIP.AUTO: 1.02
SQUAMOUS #/AREA URNS AUTO: NORMAL /HPF
UROBILINOGEN UR STRIP.AUTO-MCNC: NORMAL MG/DL
WBC #/AREA URNS AUTO: NORMAL /HPF

## 2024-05-03 ENCOUNTER — TELEPHONE (OUTPATIENT)
Dept: PRIMARY CARE | Facility: CLINIC | Age: 49
End: 2024-05-03
Payer: COMMERCIAL

## 2024-05-07 ENCOUNTER — OFFICE VISIT (OUTPATIENT)
Dept: PRIMARY CARE | Facility: CLINIC | Age: 49
End: 2024-05-07
Payer: COMMERCIAL

## 2024-05-07 VITALS
HEIGHT: 62 IN | WEIGHT: 150 LBS | SYSTOLIC BLOOD PRESSURE: 116 MMHG | BODY MASS INDEX: 27.6 KG/M2 | DIASTOLIC BLOOD PRESSURE: 62 MMHG

## 2024-05-07 DIAGNOSIS — R11.0 NAUSEA: ICD-10-CM

## 2024-05-07 DIAGNOSIS — R39.9 UTI SYMPTOMS: ICD-10-CM

## 2024-05-07 DIAGNOSIS — F41.9 ANXIETY: ICD-10-CM

## 2024-05-07 DIAGNOSIS — R30.9 URINARY PAIN: ICD-10-CM

## 2024-05-07 DIAGNOSIS — T75.3XXA SEA SICKNESS, INITIAL ENCOUNTER: ICD-10-CM

## 2024-05-07 DIAGNOSIS — R35.0 URINE FREQUENCY: ICD-10-CM

## 2024-05-07 DIAGNOSIS — B37.9 YEAST INFECTION: Primary | ICD-10-CM

## 2024-05-07 PROCEDURE — 99214 OFFICE O/P EST MOD 30 MIN: CPT | Performed by: INTERNAL MEDICINE

## 2024-05-07 RX ORDER — ONDANSETRON 4 MG/1
4 TABLET, FILM COATED ORAL EVERY 8 HOURS PRN
Qty: 20 TABLET | Refills: 0 | Status: SHIPPED | OUTPATIENT
Start: 2024-05-07 | End: 2024-05-14

## 2024-05-07 RX ORDER — NITROFURANTOIN 25; 75 MG/1; MG/1
100 CAPSULE ORAL 2 TIMES DAILY
Qty: 14 CAPSULE | Refills: 0 | Status: SHIPPED | OUTPATIENT
Start: 2024-05-07 | End: 2024-05-14

## 2024-05-07 RX ORDER — HYDROXYZINE PAMOATE 25 MG/1
25 CAPSULE ORAL 3 TIMES DAILY PRN
Qty: 30 CAPSULE | Refills: 0 | Status: SHIPPED | OUTPATIENT
Start: 2024-05-07 | End: 2024-05-17

## 2024-05-07 RX ORDER — SCOLOPAMINE TRANSDERMAL SYSTEM 1 MG/1
1 PATCH, EXTENDED RELEASE TRANSDERMAL
Qty: 10 PATCH | Refills: 0 | Status: SHIPPED | OUTPATIENT
Start: 2024-05-07 | End: 2024-07-06

## 2024-05-07 RX ORDER — FLUCONAZOLE 150 MG/1
150 TABLET ORAL ONCE
Qty: 6 TABLET | Refills: 0 | Status: CANCELLED | OUTPATIENT
Start: 2024-05-07 | End: 2024-05-07

## 2024-05-07 RX ORDER — FLUCONAZOLE 100 MG/1
100 TABLET ORAL 2 TIMES DAILY
Qty: 6 TABLET | Refills: 0 | Status: SHIPPED | OUTPATIENT
Start: 2024-05-07 | End: 2024-05-10

## 2024-05-14 NOTE — PROGRESS NOTES
"Subjective   Patient ID: Debby Polanco is a 48 y.o. female who presents for multiple medical issues..    Debby Polanco today came here for multiple medical issues.  1. Increased frequency and UTI symptoms, not feeling good.  2.  She is taking ______.  She wants some scopolamine patch. She gets nauseated.  3. Vaginal yeast.  She is concerned. Overall, she is feeling good.  No problem.    I have personally reviewed the patient's Past Medical History, Medications, Allergies, Social History, and Family History in the EMR.    Review of Systems   All other systems reviewed and are negative.    Objective   /62   Ht 1.575 m (5' 2\")   Wt 68 kg (150 lb)   BMI 27.44 kg/m²     Physical Exam  Vitals reviewed.   Cardiovascular:      Heart sounds: Normal heart sounds, S1 normal and S2 normal. No murmur heard.     No friction rub.   Pulmonary:      Effort: Pulmonary effort is normal.      Breath sounds: Normal breath sounds and air entry.   Abdominal:      Palpations: There is no hepatomegaly, splenomegaly or mass.   Musculoskeletal:      Right lower leg: No edema.      Left lower leg: No edema.   Lymphadenopathy:      Lower Body: No right inguinal adenopathy. No left inguinal adenopathy.   Neurological:      Cranial Nerves: Cranial nerves 2-12 are intact.      Sensory: No sensory deficit.      Motor: Motor function is intact.      Deep Tendon Reflexes: Reflexes are normal and symmetric.     LAB WORK:  Laboratory testing discussed.    Assessment/Plan   Problem List Items Addressed This Visit             ICD-10-CM       Mental Health    Anxiety F41.9     Other Visit Diagnoses         Codes    Yeast infection    -  Primary B37.9    Nausea     R11.0    Relevant Medications    ondansetron (Zofran) 4 mg tablet    nitrofurantoin, macrocrystal-monohydrate, (Macrobid) 100 mg capsule    hydrOXYzine pamoate (VistariL) 25 mg capsule    scopolamine (Transderm-Scop) 1 mg over 3 days patch 3 day    Urinary pain     R30.9    Relevant " Medications    ondansetron (Zofran) 4 mg tablet    nitrofurantoin, macrocrystal-monohydrate, (Macrobid) 100 mg capsule    hydrOXYzine pamoate (VistariL) 25 mg capsule    scopolamine (Transderm-Scop) 1 mg over 3 days patch 3 day    Urine frequency     R35.0    UTI symptoms     R39.9    Sea sickness, initial encounter     T75.3XXA        1.  Increased frequency of urine, UTI symptoms.  Macrobid given.  2.  Vaginal yeast.  Diflucan given.  3. Sea sickness.  Scopolamine patch.  4. Nausea.  Zofran given.  5. Flight anxiety.  Vistaril given.  6. Follow-up appointment with me after she comes back from vacation.  Always happy to serve her anytime if necessary.    Scribe Attestation  By signing my name below, IBarby Scribe attest that this documentation has been prepared under the direction and in the presence of Monse Laura MD.

## 2024-05-16 ENCOUNTER — APPOINTMENT (OUTPATIENT)
Dept: LAB | Facility: HOSPITAL | Age: 49
End: 2024-05-16
Payer: COMMERCIAL

## 2024-05-23 ENCOUNTER — TELEMEDICINE (OUTPATIENT)
Dept: HEMATOLOGY/ONCOLOGY | Facility: HOSPITAL | Age: 49
End: 2024-05-23
Payer: COMMERCIAL

## 2024-05-23 ENCOUNTER — DOCUMENTATION (OUTPATIENT)
Dept: HEMATOLOGY/ONCOLOGY | Facility: HOSPITAL | Age: 49
End: 2024-05-23
Payer: COMMERCIAL

## 2024-05-23 ENCOUNTER — TELEPHONE (OUTPATIENT)
Dept: HEMATOLOGY/ONCOLOGY | Facility: HOSPITAL | Age: 49
End: 2024-05-23
Payer: COMMERCIAL

## 2024-05-23 DIAGNOSIS — D64.9 ANEMIA, UNSPECIFIED TYPE: Primary | ICD-10-CM

## 2024-05-23 DIAGNOSIS — E61.1 IRON DEFICIENCY: ICD-10-CM

## 2024-05-23 DIAGNOSIS — N93.9 ABNORMAL UTERINE BLEEDING: ICD-10-CM

## 2024-05-23 DIAGNOSIS — K91.2 POSTSURGICAL MALABSORPTION (HHS-HCC): ICD-10-CM

## 2024-05-23 DIAGNOSIS — K90.9 MALABSORPTION OF IRON (HHS-HCC): ICD-10-CM

## 2024-05-23 DIAGNOSIS — D64.9 ANEMIA, UNSPECIFIED TYPE: ICD-10-CM

## 2024-05-23 NOTE — PROGRESS NOTES
Notified by Central Scheduler Kassandra Zabala that patient called into notify this clinic that she attempted to reschedule her visit to 6/4/24 at 11:00am [facility] as she is vacationing on a boat at this time.    This provider rescheduled patient's 5/23/24 visit to 6/4/24 at 11:30am. Patient to have labs drawn 1 week prior to visit (CBC-D, Iron studies)

## 2024-05-23 NOTE — TELEPHONE ENCOUNTER
RN called and left a message because the PA Joshua noticed the patient did not get her labs yet so he would like for her to get them this week and reschedule FUV next week.

## 2024-05-31 ENCOUNTER — LAB (OUTPATIENT)
Dept: LAB | Facility: LAB | Age: 49
End: 2024-05-31
Payer: COMMERCIAL

## 2024-05-31 DIAGNOSIS — D64.9 ANEMIA, UNSPECIFIED TYPE: ICD-10-CM

## 2024-05-31 DIAGNOSIS — K90.9 MALABSORPTION OF IRON (HHS-HCC): ICD-10-CM

## 2024-05-31 DIAGNOSIS — E61.1 IRON DEFICIENCY: ICD-10-CM

## 2024-05-31 DIAGNOSIS — N93.9 ABNORMAL UTERINE BLEEDING: ICD-10-CM

## 2024-05-31 DIAGNOSIS — K91.2 POSTSURGICAL MALABSORPTION (HHS-HCC): ICD-10-CM

## 2024-05-31 LAB
ALBUMIN SERPL BCP-MCNC: 4.3 G/DL (ref 3.4–5)
ALP SERPL-CCNC: 78 U/L (ref 33–110)
ALT SERPL W P-5'-P-CCNC: 7 U/L (ref 7–45)
ANION GAP SERPL CALC-SCNC: 14 MMOL/L (ref 10–20)
AST SERPL W P-5'-P-CCNC: 12 U/L (ref 9–39)
BASOPHILS # BLD MANUAL: 0.15 X10*3/UL (ref 0–0.1)
BASOPHILS NFR BLD MANUAL: 1.8 %
BILIRUB SERPL-MCNC: 0.2 MG/DL (ref 0–1.2)
BUN SERPL-MCNC: 10 MG/DL (ref 6–23)
BURR CELLS BLD QL SMEAR: ABNORMAL
CALCIUM SERPL-MCNC: 9.4 MG/DL (ref 8.6–10.6)
CHLORIDE SERPL-SCNC: 103 MMOL/L (ref 98–107)
CO2 SERPL-SCNC: 27 MMOL/L (ref 21–32)
CREAT SERPL-MCNC: 0.62 MG/DL (ref 0.5–1.05)
EGFRCR SERPLBLD CKD-EPI 2021: >90 ML/MIN/1.73M*2
EOSINOPHIL # BLD MANUAL: 0.22 X10*3/UL (ref 0–0.7)
EOSINOPHIL NFR BLD MANUAL: 2.6 %
ERYTHROCYTE [DISTWIDTH] IN BLOOD BY AUTOMATED COUNT: 20.8 % (ref 11.5–14.5)
FERRITIN SERPL-MCNC: 21 NG/ML (ref 8–150)
FOLATE SERPL-MCNC: 7.6 NG/ML
GLUCOSE SERPL-MCNC: 88 MG/DL (ref 74–99)
HCT VFR BLD AUTO: 37.4 % (ref 36–46)
HGB BLD-MCNC: 12.8 G/DL (ref 12–16)
HYPOCHROMIA BLD QL SMEAR: ABNORMAL
IMM GRANULOCYTES # BLD AUTO: 0.1 X10*3/UL (ref 0–0.7)
IMM GRANULOCYTES NFR BLD AUTO: 1.2 % (ref 0–0.9)
IRON SATN MFR SERPL: 7 % (ref 25–45)
IRON SERPL-MCNC: 27 UG/DL (ref 35–150)
LYMPHOCYTES # BLD MANUAL: 1.78 X10*3/UL (ref 1.2–4.8)
LYMPHOCYTES NFR BLD MANUAL: 21.2 %
MCH RBC QN AUTO: 27.5 PG (ref 26–34)
MCHC RBC AUTO-ENTMCNC: 34.2 G/DL (ref 32–36)
MCV RBC AUTO: 80 FL (ref 80–100)
MONOCYTES # BLD MANUAL: 0.23 X10*3/UL (ref 0.1–1)
MONOCYTES NFR BLD MANUAL: 2.7 %
NEUTS SEG # BLD MANUAL: 6.02 X10*3/UL (ref 1.2–7)
NEUTS SEG NFR BLD MANUAL: 71.7 %
NRBC BLD-RTO: 0 /100 WBCS (ref 0–0)
OVALOCYTES BLD QL SMEAR: ABNORMAL
PLATELET # BLD AUTO: 357 X10*3/UL (ref 150–450)
POTASSIUM SERPL-SCNC: 3.8 MMOL/L (ref 3.5–5.3)
PROT SERPL-MCNC: 6.6 G/DL (ref 6.4–8.2)
RBC # BLD AUTO: 4.65 X10*6/UL (ref 4–5.2)
RBC MORPH BLD: ABNORMAL
SODIUM SERPL-SCNC: 140 MMOL/L (ref 136–145)
TIBC SERPL-MCNC: 385 UG/DL (ref 240–445)
TOTAL CELLS COUNTED BLD: 113
UIBC SERPL-MCNC: 358 UG/DL (ref 110–370)
VIT B12 SERPL-MCNC: 315 PG/ML (ref 211–911)
WBC # BLD AUTO: 8.4 X10*3/UL (ref 4.4–11.3)

## 2024-05-31 PROCEDURE — 80053 COMPREHEN METABOLIC PANEL: CPT

## 2024-05-31 PROCEDURE — 85027 COMPLETE CBC AUTOMATED: CPT

## 2024-05-31 PROCEDURE — 83550 IRON BINDING TEST: CPT

## 2024-05-31 PROCEDURE — 82746 ASSAY OF FOLIC ACID SERUM: CPT

## 2024-05-31 PROCEDURE — 82607 VITAMIN B-12: CPT

## 2024-05-31 PROCEDURE — 82728 ASSAY OF FERRITIN: CPT

## 2024-05-31 PROCEDURE — 85007 BL SMEAR W/DIFF WBC COUNT: CPT

## 2024-05-31 PROCEDURE — 36415 COLL VENOUS BLD VENIPUNCTURE: CPT

## 2024-05-31 PROCEDURE — 83540 ASSAY OF IRON: CPT

## 2024-06-04 ENCOUNTER — TELEMEDICINE (OUTPATIENT)
Dept: HEMATOLOGY/ONCOLOGY | Facility: HOSPITAL | Age: 49
End: 2024-06-04
Payer: COMMERCIAL

## 2024-06-04 DIAGNOSIS — K90.9 MALABSORPTION OF IRON (HHS-HCC): ICD-10-CM

## 2024-06-04 DIAGNOSIS — N93.9 ABNORMAL UTERINE BLEEDING: Primary | ICD-10-CM

## 2024-06-04 DIAGNOSIS — D64.9 ANEMIA, UNSPECIFIED TYPE: ICD-10-CM

## 2024-06-04 DIAGNOSIS — K91.2 POSTSURGICAL MALABSORPTION (HHS-HCC): ICD-10-CM

## 2024-06-04 PROCEDURE — 99213 OFFICE O/P EST LOW 20 MIN: CPT | Performed by: PHYSICIAN ASSISTANT

## 2024-06-04 RX ORDER — DIPHENHYDRAMINE HYDROCHLORIDE 50 MG/ML
25 INJECTION INTRAMUSCULAR; INTRAVENOUS ONCE
OUTPATIENT
Start: 2024-06-04

## 2024-06-04 RX ORDER — DIPHENHYDRAMINE HYDROCHLORIDE 50 MG/ML
50 INJECTION INTRAMUSCULAR; INTRAVENOUS AS NEEDED
OUTPATIENT
Start: 2024-06-04

## 2024-06-04 RX ORDER — ALBUTEROL SULFATE 0.83 MG/ML
3 SOLUTION RESPIRATORY (INHALATION) AS NEEDED
OUTPATIENT
Start: 2024-06-04

## 2024-06-04 RX ORDER — ACETAMINOPHEN 325 MG/1
650 TABLET ORAL ONCE
OUTPATIENT
Start: 2024-06-04

## 2024-06-04 RX ORDER — ONDANSETRON HYDROCHLORIDE 2 MG/ML
8 INJECTION, SOLUTION INTRAVENOUS ONCE
OUTPATIENT
Start: 2024-06-04

## 2024-06-04 RX ORDER — EPINEPHRINE 0.3 MG/.3ML
0.3 INJECTION SUBCUTANEOUS EVERY 5 MIN PRN
OUTPATIENT
Start: 2024-06-04

## 2024-06-04 RX ORDER — FAMOTIDINE 10 MG/ML
20 INJECTION INTRAVENOUS ONCE AS NEEDED
OUTPATIENT
Start: 2024-06-04

## 2024-06-04 NOTE — PROGRESS NOTES
Patient ID: Debby Polanco is a 48 y.o. female.  Referring Physician: No referring provider defined for this encounter.  Primary Care Provider: Monse Laura MD  Visit Type: Follow Up    Location: [facility]  Diagnosis/Reason: KATERINA (2/2 post-surgical malabsorption s/p RYGB, AUB, Fibroids)    Interval Hx  6/4/24  Debby Polanco is a 48 y.o. female following up today for KATERINA (2/2 post-surgical malabsorption s/p RYGB, AUB, uterine fibroids)    She presents today via virtual visit to discuss results from 2/22/24 visit    NOTE:  She has been followed by Dr. Louis for anemia to which he attributed to be d/t malabsorption 2/2 RYGB, GERD, uterine fibroids and menorrhagia.   At her 4/24/23 follow-up visit w/ Dr. Louis, she was ordered to have an additional cycle 2 doses of IV iron sucrose. She received those doses on 5/8/23 and 5/15/23 - She was also recommended  to follow-up w/ OB-GYN which she did on 5/31/23 where she was started on norethindrone and referred to radiology for possible uterine fibroid embolization  2/22/24 - She was last seen by Dr. Louis 8/31/23 and was ordered to receive 2 doses IV iron sucrose (Venofer) and to f/u 4 months later but did not  2/27/24 - Patient ordered to receive additional cycle IV iron sucrose (Venofer) at 300mg x 3 once weekly doses  6/4/24 - Patient ordered to receive additional cycle IV iron sucrose (Venofer) at 300mg x 3 once weekly doses as recent labs reveal iron deficiency    Patient was advised at last visit, that this provider would call her today at a time convenient to review results & provide time for questions and answers and that if patient was unavailable, a detailed message discussing results, plan of care and contact information would be provided. Patient verbalized understanding. 2 attempts were made to reach patient at the available numbers in patient's EMR. Attempts were made at 10:00 and 10:27am. Per previous visit, patient provided this provider with permission  to leave message on voicemail discussing results and plan of care. Voicemail left on patient's voicemail detailing results and plan of care. Left callback information with instructions to contact this clinic with any questions or concerns.    Relevant Hx  8/31/23 - Last Visit w/ Dr. Heriberto TIDWELL JAMES GOLDMAN is a 47 year old Female with a PMH significant for krystal-en-y gastric bypass for bariatric surgery, complicated by small bowel resections, uterine fibroids and menorrhagia, significant anemia since at least 3yrs, GERD, anxiety/depression  and h/o esophageal strictures who is referred for evaluation of her anemia. Presents with her sister.      Pt reports she was told she has aplastic anemia 3yrs back but has effectively received no treatment since then. Has instead gone to the ER for transfusions since the beginning of this year, last received 2U RBC in May. She had presented with chest pain,  SOB, lightheadedness. She also received venofer at [facility] ~6 months back. Her preceding labs over last few years has shown anisocytosis, microcytosis, with Hb 7-9, in 2020 had mixed KATERINA and AI, with normal B12. By the end of 2020 she  had become clearly iron deficient. She was seen by  in 2020 and there was a plan to give her IV iron, additionally there was note that she was also on sublingual B12. No follow up since then.      Reports chronic menorrhagia, with passage of large clots for yrs, and follows with gynecology at [facility]. Pt reports that she does not take PO iron and her diet is very limited to a particular brand of mozzarella. She eats nothing else. She notes that  during her management for esophageal strictures, her diet had to be limited and she has not been able to get herself to eat more despite no longer having any restrictions.     Does not have a family h/o anemia, but mother had thyroid and then breast cancer (70s), maternal grandmother had breast and lung cancer. Sister has  been tested and is BRCA negative, pt has not been tested.      Other than menstrual bleeding has no other reported sources of bleeding. Has not had a mammo in the last 5yrs, never had a colo, last upper endoscopy was 3yrs back and was noted to have a hiatal hernia.    PMHx:  Active Ambulatory Problems     Diagnosis Date Noted    Anxiety 09/05/2023    Depression 09/05/2023    Hypokalemia 09/05/2023    Iron deficiency anemia secondary to inadequate dietary iron intake 09/05/2023    Abnormal uterine bleeding 09/28/2023    Anemia 09/28/2023    Fibroid 09/28/2023    Glossitis 09/28/2023    Oral candidiasis 09/28/2023    Pyelonephritis 09/28/2023    Urinary frequency 09/28/2023    Postsurgical malabsorption ([facility]CC) 02/27/2024    Malabsorption of iron ([facility]-HCC) 02/27/2024    Iron deficiency 02/27/2024     Resolved Ambulatory Problems     Diagnosis Date Noted    No Resolved Ambulatory Problems     Past Medical History:   Diagnosis Date    Low vitamin D level     Overweight     Personal history of other mental and behavioral disorders 07/17/2014    Personal history of other mental and behavioral disorders     Postmenopausal     Pure hypercholesterolemia     Sleep disorder, unspecified     Type 2 diabetes mellitus (Multi)        PSHx:  Past Surgical History:   Procedure Laterality Date    GASTRIC BYPASS  07/17/2014    Gastric Surgery For Morbid Obesity Bypass With Windy-en-Y       FHx:  Family History   Problem Relation Name Age of Onset    Thyroid cancer Mother      Breast cancer Mother      Hypertension Father      Cancer Other      Endometriosis Other      Diabetes Other      Hypertension Other         Social Hx:  Debby GOLDMAN Polanco    reports that she has never smoked. She has never been exposed to tobacco smoke. She has never used smokeless tobacco.  She  reports no history of alcohol use.  She  reports no history of drug use.  Social History     Socioeconomic History    Marital status: Single     Spouse name: Not on file     Number of children: Not on file    Years of education: Not on file    Highest education level: Not on file   Occupational History    Occupation: She is a retired nurse.   Tobacco Use    Smoking status: Never     Passive exposure: Never    Smokeless tobacco: Never   Substance and Sexual Activity    Alcohol use: Never    Drug use: Never    Sexual activity: Not on file   Other Topics Concern    Not on file   Social History Narrative    Not on file     Social Determinants of Health     Financial Resource Strain: Not on file   Food Insecurity: Not on file   Transportation Needs: Not on file   Physical Activity: Not on file   Stress: Not on file   Social Connections: Not on file   Intimate Partner Violence: Not on file   Housing Stability: Not on file      Occupation: Former nurse, now working in [employer]  Marital Status:   Alcohol Use: Denies  Smoking: Never smoker  Recreational Drug Use: Denies    Medications and allergies reviewed in EMR.    ROS:  Review of Systems - Oncology   10 point review of systems negative except as state in HPI.    Vitals & Statistics:  Objective   BSA: There is no height or weight on file to calculate BSA.  There were no vitals taken for this visit.    Physical Exam:  N/A - Virtual visit    Results:  Lab Results   Component Value Date    WBC 8.4 05/31/2024    NEUTROABS 5.06 02/22/2024    IGABSOL 0.10 05/31/2024    LYMPHSABS 1.72 02/22/2024    MONOSABS 0.56 02/22/2024    EOSABS 0.22 05/31/2024    BASOSABS 0.15 (H) 05/31/2024    RBC 4.65 05/31/2024    MCV 80 05/31/2024    MCHC 34.2 05/31/2024    HGB 12.8 05/31/2024    HCT 37.4 05/31/2024     05/31/2024     Lab Results   Component Value Date    RETICCTPCT 1.8 04/24/2023      Lab Results   Component Value Date    CREATININE 0.62 05/31/2024    BUN 10 05/31/2024    EGFR >90 05/31/2024     05/31/2024    K 3.8 05/31/2024     05/31/2024    CO2 27 05/31/2024      Lab Results   Component Value Date    ALT 7 05/31/2024    AST  "12 05/31/2024    ALKPHOS 78 05/31/2024    BILITOT 0.2 05/31/2024      Lab Results   Component Value Date    TSH 3.97 04/25/2024     Lab Results   Component Value Date    TSH 3.97 04/25/2024     Lab Results   Component Value Date    IRON 27 (L) 05/31/2024    TIBC 385 05/31/2024    FERRITIN 21 05/31/2024      Lab Results   Component Value Date    GKHQNBFJ26 315 05/31/2024      Lab Results   Component Value Date    FOLATE 7.6 05/31/2024     No results found for: \"JANNIE\", \"RF\", \"SEDRATE\"   No results found for: \"CRP\"   No results found for: \"MAMTA\"  Lab Results   Component Value Date     07/25/2022     Lab Results   Component Value Date    HAPTOGLOBIN 105 07/25/2022     Lab Results   Component Value Date    SPEP NORMAL 07/25/2022     No results found for: \"IGG\", \"IGM\", \"IGA\"  No results found for: \"HEPATOT\", \"HEPAIGM\", \"HEPBCIGM\", \"HEPBCAB\", \"HEPBSAG\", \"HEPCAB\"  No results found for: \"HIV1X2\"    Assessment:  Debby Polanco is a 48 y.o. female following up today for KATERINA (2/2 post-surgical malabsorption s/p RYGB, AUB, uterine fibroids)     I reviewed patient's chart including but not limited to labs, imaging, surgical/procedure notes, pathology, hospital notes, doctor's notes.    5/31/24 results:  WBC count WNL - Basophilia at 0.15  RBC count, MCV, MCHC, H&H all WNL - RDW elevated  Platelets WNL  Iron studies: Ferritin low at 21, Iron low at 27, TIBC WNL & %Sat low at 7%     Plan:  KATERINA (2/2 Post-surgical Malabsorption s/p RYGB, AUB, Uterine Fibroids)  Request prior authorization for IV iron sucrose (Venofer) 300mg weekly x 3  Dx Codes: D64.9 - Anemia, E61.1 - Iron Deficiency, K90.9 - Malabsorption of Iron, K91.2 - Post-surgical malabsorption  Anticipated start date: 6/12, 6/19, 6/26  Will switch to IV ferumoxytol (Feraheme) 510mg weekly x 2 if preferred by insurance  RTC in 2 months via virtual visit w/ CBC-D, CMP, Iron studies up to 1 week prior - F/u sooner if needed/urgent  Plan for additional IV iron supplementation " should patient be shown to be iron deficient    I had an extensive discussion with the patient regarding the diagnosis and discussed the plan of therapy, including general considerations regarding side effects and outcomes. Pt understood and gave appropriate teach back about the plan of care. All questions were answered to the patient's satisfaction. The patient is instructed to contact us at any time if questions or problems arise. Thank you for the opportunity to participate in the care of this very pleasant patient.    Total time = 30 minutes. 50% or more of this time was spent in counseling and/or coordination of care including reviewing medical history/radiology/labs, examining patient, formulating outlined plan with team, and discussing plan with patient/family.      Atul Lewis PA-C

## 2024-06-12 ENCOUNTER — INFUSION (OUTPATIENT)
Dept: HEMATOLOGY/ONCOLOGY | Facility: HOSPITAL | Age: 49
End: 2024-06-12
Payer: COMMERCIAL

## 2024-06-12 VITALS
TEMPERATURE: 97.5 F | WEIGHT: 147.05 LBS | OXYGEN SATURATION: 99 % | RESPIRATION RATE: 18 BRPM | DIASTOLIC BLOOD PRESSURE: 86 MMHG | HEART RATE: 90 BPM | BODY MASS INDEX: 26.9 KG/M2 | SYSTOLIC BLOOD PRESSURE: 126 MMHG

## 2024-06-12 DIAGNOSIS — K91.2 POSTSURGICAL MALABSORPTION (HHS-HCC): ICD-10-CM

## 2024-06-12 DIAGNOSIS — D64.9 ANEMIA, UNSPECIFIED TYPE: ICD-10-CM

## 2024-06-12 DIAGNOSIS — K90.9 MALABSORPTION OF IRON (HHS-HCC): ICD-10-CM

## 2024-06-12 DIAGNOSIS — N93.9 ABNORMAL UTERINE BLEEDING: ICD-10-CM

## 2024-06-12 PROCEDURE — 2500000004 HC RX 250 GENERAL PHARMACY W/ HCPCS (ALT 636 FOR OP/ED): Performed by: PHYSICIAN ASSISTANT

## 2024-06-12 PROCEDURE — 96365 THER/PROPH/DIAG IV INF INIT: CPT | Mod: INF

## 2024-06-12 PROCEDURE — 96375 TX/PRO/DX INJ NEW DRUG ADDON: CPT | Mod: INF

## 2024-06-12 RX ORDER — DIPHENHYDRAMINE HYDROCHLORIDE 50 MG/ML
50 INJECTION INTRAMUSCULAR; INTRAVENOUS AS NEEDED
OUTPATIENT
Start: 2024-06-16

## 2024-06-12 RX ORDER — ONDANSETRON HYDROCHLORIDE 2 MG/ML
8 INJECTION, SOLUTION INTRAVENOUS ONCE
OUTPATIENT
Start: 2024-06-16 | End: 2024-06-16

## 2024-06-12 RX ORDER — EPINEPHRINE 0.3 MG/.3ML
0.3 INJECTION SUBCUTANEOUS EVERY 5 MIN PRN
OUTPATIENT
Start: 2024-06-16

## 2024-06-12 RX ORDER — FAMOTIDINE 10 MG/ML
20 INJECTION INTRAVENOUS ONCE AS NEEDED
OUTPATIENT
Start: 2024-06-16

## 2024-06-12 RX ORDER — DIPHENHYDRAMINE HYDROCHLORIDE 50 MG/ML
25 INJECTION INTRAMUSCULAR; INTRAVENOUS ONCE
OUTPATIENT
Start: 2024-06-16 | End: 2024-06-16

## 2024-06-12 RX ORDER — EPINEPHRINE 0.3 MG/.3ML
0.3 INJECTION SUBCUTANEOUS EVERY 5 MIN PRN
Status: DISCONTINUED | OUTPATIENT
Start: 2024-06-12 | End: 2024-06-12 | Stop reason: HOSPADM

## 2024-06-12 RX ORDER — ONDANSETRON HYDROCHLORIDE 2 MG/ML
8 INJECTION, SOLUTION INTRAVENOUS ONCE
Status: COMPLETED | OUTPATIENT
Start: 2024-06-12 | End: 2024-06-12

## 2024-06-12 RX ORDER — FAMOTIDINE 10 MG/ML
20 INJECTION INTRAVENOUS ONCE AS NEEDED
Status: DISCONTINUED | OUTPATIENT
Start: 2024-06-12 | End: 2024-06-12 | Stop reason: HOSPADM

## 2024-06-12 RX ORDER — DIPHENHYDRAMINE HYDROCHLORIDE 50 MG/ML
50 INJECTION INTRAMUSCULAR; INTRAVENOUS AS NEEDED
Status: DISCONTINUED | OUTPATIENT
Start: 2024-06-12 | End: 2024-06-12 | Stop reason: HOSPADM

## 2024-06-12 RX ORDER — ALBUTEROL SULFATE 0.83 MG/ML
3 SOLUTION RESPIRATORY (INHALATION) AS NEEDED
Status: DISCONTINUED | OUTPATIENT
Start: 2024-06-12 | End: 2024-06-12 | Stop reason: HOSPADM

## 2024-06-12 RX ORDER — DIPHENHYDRAMINE HYDROCHLORIDE 50 MG/ML
25 INJECTION INTRAMUSCULAR; INTRAVENOUS ONCE
Status: COMPLETED | OUTPATIENT
Start: 2024-06-12 | End: 2024-06-12

## 2024-06-12 RX ORDER — ALBUTEROL SULFATE 0.83 MG/ML
3 SOLUTION RESPIRATORY (INHALATION) AS NEEDED
OUTPATIENT
Start: 2024-06-16

## 2024-06-12 RX ORDER — ACETAMINOPHEN 325 MG/1
650 TABLET ORAL ONCE
OUTPATIENT
Start: 2024-06-16 | End: 2024-06-16

## 2024-06-12 RX ORDER — ACETAMINOPHEN 325 MG/1
650 TABLET ORAL ONCE
Status: COMPLETED | OUTPATIENT
Start: 2024-06-12 | End: 2024-06-12

## 2024-06-12 ASSESSMENT — PAIN SCALES - GENERAL: PAINLEVEL: 0-NO PAIN

## 2024-06-12 NOTE — PROGRESS NOTES
Patient in infusion center for venofer infusion. Patient tolerated infusion well. Patient refused to stay for obs period. Patient was educated to go to ED if symptoms arised.

## 2024-06-19 ENCOUNTER — INFUSION (OUTPATIENT)
Dept: HEMATOLOGY/ONCOLOGY | Facility: HOSPITAL | Age: 49
End: 2024-06-19
Payer: COMMERCIAL

## 2024-06-19 VITALS
WEIGHT: 145.06 LBS | RESPIRATION RATE: 18 BRPM | SYSTOLIC BLOOD PRESSURE: 90 MMHG | BODY MASS INDEX: 26.53 KG/M2 | HEART RATE: 90 BPM | DIASTOLIC BLOOD PRESSURE: 70 MMHG | OXYGEN SATURATION: 99 % | TEMPERATURE: 97.3 F

## 2024-06-19 DIAGNOSIS — D64.9 ANEMIA, UNSPECIFIED TYPE: ICD-10-CM

## 2024-06-19 DIAGNOSIS — K90.9 MALABSORPTION OF IRON (HHS-HCC): ICD-10-CM

## 2024-06-19 DIAGNOSIS — E61.1 IRON DEFICIENCY: ICD-10-CM

## 2024-06-19 DIAGNOSIS — K91.2 POSTSURGICAL MALABSORPTION (HHS-HCC): ICD-10-CM

## 2024-06-19 DIAGNOSIS — N93.9 ABNORMAL UTERINE BLEEDING: ICD-10-CM

## 2024-06-19 PROCEDURE — 2500000004 HC RX 250 GENERAL PHARMACY W/ HCPCS (ALT 636 FOR OP/ED): Performed by: PHYSICIAN ASSISTANT

## 2024-06-19 PROCEDURE — 96365 THER/PROPH/DIAG IV INF INIT: CPT | Mod: INF

## 2024-06-19 PROCEDURE — 96375 TX/PRO/DX INJ NEW DRUG ADDON: CPT | Mod: INF

## 2024-06-19 RX ORDER — ALBUTEROL SULFATE 0.83 MG/ML
3 SOLUTION RESPIRATORY (INHALATION) AS NEEDED
OUTPATIENT
Start: 2024-06-20

## 2024-06-19 RX ORDER — HEPARIN 100 UNIT/ML
500 SYRINGE INTRAVENOUS AS NEEDED
OUTPATIENT
Start: 2024-06-19

## 2024-06-19 RX ORDER — ONDANSETRON HYDROCHLORIDE 2 MG/ML
8 INJECTION, SOLUTION INTRAVENOUS ONCE
OUTPATIENT
Start: 2024-06-20 | End: 2024-06-20

## 2024-06-19 RX ORDER — DIPHENHYDRAMINE HYDROCHLORIDE 50 MG/ML
25 INJECTION INTRAMUSCULAR; INTRAVENOUS ONCE
Status: COMPLETED | OUTPATIENT
Start: 2024-06-19 | End: 2024-06-19

## 2024-06-19 RX ORDER — HEPARIN SODIUM,PORCINE/PF 10 UNIT/ML
50 SYRINGE (ML) INTRAVENOUS AS NEEDED
OUTPATIENT
Start: 2024-06-19

## 2024-06-19 RX ORDER — EPINEPHRINE 0.3 MG/.3ML
0.3 INJECTION SUBCUTANEOUS EVERY 5 MIN PRN
OUTPATIENT
Start: 2024-06-20

## 2024-06-19 RX ORDER — FAMOTIDINE 10 MG/ML
20 INJECTION INTRAVENOUS ONCE AS NEEDED
OUTPATIENT
Start: 2024-06-20

## 2024-06-19 RX ORDER — DIPHENHYDRAMINE HYDROCHLORIDE 50 MG/ML
50 INJECTION INTRAMUSCULAR; INTRAVENOUS AS NEEDED
OUTPATIENT
Start: 2024-06-20

## 2024-06-19 RX ORDER — ACETAMINOPHEN 325 MG/1
650 TABLET ORAL ONCE
OUTPATIENT
Start: 2024-06-20 | End: 2024-06-20

## 2024-06-19 RX ORDER — DIPHENHYDRAMINE HYDROCHLORIDE 50 MG/ML
25 INJECTION INTRAMUSCULAR; INTRAVENOUS ONCE
OUTPATIENT
Start: 2024-06-20 | End: 2024-06-20

## 2024-06-19 RX ORDER — ONDANSETRON HYDROCHLORIDE 2 MG/ML
8 INJECTION, SOLUTION INTRAVENOUS ONCE
Status: COMPLETED | OUTPATIENT
Start: 2024-06-19 | End: 2024-06-19

## 2024-06-19 RX ORDER — ACETAMINOPHEN 325 MG/1
650 TABLET ORAL ONCE
Status: COMPLETED | OUTPATIENT
Start: 2024-06-19 | End: 2024-06-19

## 2024-06-19 ASSESSMENT — PAIN SCALES - GENERAL: PAINLEVEL: 0-NO PAIN

## 2024-06-19 NOTE — PROGRESS NOTES
Patient arrived ambulatory to infusion for scheduled tx of iron sucrose. Denies any new or worsening sx. Tolerated infusion without issue. Declines 30 min post-infusion observation, aware of s/sx hypersensitivity rxn. Aware of upcoming appts. Discharged in stable condition.

## 2024-06-26 ENCOUNTER — INFUSION (OUTPATIENT)
Dept: HEMATOLOGY/ONCOLOGY | Facility: HOSPITAL | Age: 49
End: 2024-06-26
Payer: COMMERCIAL

## 2024-06-26 VITALS
SYSTOLIC BLOOD PRESSURE: 118 MMHG | HEART RATE: 81 BPM | RESPIRATION RATE: 18 BRPM | OXYGEN SATURATION: 99 % | WEIGHT: 145.72 LBS | BODY MASS INDEX: 26.65 KG/M2 | DIASTOLIC BLOOD PRESSURE: 73 MMHG | TEMPERATURE: 97 F

## 2024-06-26 DIAGNOSIS — K90.9 MALABSORPTION OF IRON (HHS-HCC): ICD-10-CM

## 2024-06-26 DIAGNOSIS — K91.2 POSTSURGICAL MALABSORPTION (HHS-HCC): ICD-10-CM

## 2024-06-26 DIAGNOSIS — N93.9 ABNORMAL UTERINE BLEEDING: ICD-10-CM

## 2024-06-26 DIAGNOSIS — D64.9 ANEMIA, UNSPECIFIED TYPE: ICD-10-CM

## 2024-06-26 PROCEDURE — 2500000004 HC RX 250 GENERAL PHARMACY W/ HCPCS (ALT 636 FOR OP/ED): Performed by: PHYSICIAN ASSISTANT

## 2024-06-26 PROCEDURE — 96365 THER/PROPH/DIAG IV INF INIT: CPT | Mod: INF

## 2024-06-26 PROCEDURE — 96375 TX/PRO/DX INJ NEW DRUG ADDON: CPT | Mod: INF

## 2024-06-26 RX ORDER — ACETAMINOPHEN 325 MG/1
650 TABLET ORAL ONCE
Status: CANCELLED | OUTPATIENT
Start: 2024-06-27 | End: 2024-06-27

## 2024-06-26 RX ORDER — ONDANSETRON HYDROCHLORIDE 2 MG/ML
8 INJECTION, SOLUTION INTRAVENOUS ONCE
Status: COMPLETED | OUTPATIENT
Start: 2024-06-26 | End: 2024-06-26

## 2024-06-26 RX ORDER — DIPHENHYDRAMINE HYDROCHLORIDE 50 MG/ML
25 INJECTION INTRAMUSCULAR; INTRAVENOUS ONCE
Status: COMPLETED | OUTPATIENT
Start: 2024-06-26 | End: 2024-06-26

## 2024-06-26 RX ORDER — FAMOTIDINE 10 MG/ML
20 INJECTION INTRAVENOUS ONCE AS NEEDED
OUTPATIENT
Start: 2024-06-27

## 2024-06-26 RX ORDER — DIPHENHYDRAMINE HYDROCHLORIDE 50 MG/ML
25 INJECTION INTRAMUSCULAR; INTRAVENOUS ONCE
Status: CANCELLED | OUTPATIENT
Start: 2024-06-27 | End: 2024-06-27

## 2024-06-26 RX ORDER — EPINEPHRINE 0.3 MG/.3ML
0.3 INJECTION SUBCUTANEOUS EVERY 5 MIN PRN
Status: DISCONTINUED | OUTPATIENT
Start: 2024-06-26 | End: 2024-06-26 | Stop reason: HOSPADM

## 2024-06-26 RX ORDER — EPINEPHRINE 0.3 MG/.3ML
0.3 INJECTION SUBCUTANEOUS EVERY 5 MIN PRN
OUTPATIENT
Start: 2024-06-27

## 2024-06-26 RX ORDER — ALBUTEROL SULFATE 0.83 MG/ML
3 SOLUTION RESPIRATORY (INHALATION) AS NEEDED
Status: DISCONTINUED | OUTPATIENT
Start: 2024-06-26 | End: 2024-06-26 | Stop reason: HOSPADM

## 2024-06-26 RX ORDER — ACETAMINOPHEN 325 MG/1
650 TABLET ORAL ONCE
Status: COMPLETED | OUTPATIENT
Start: 2024-06-26 | End: 2024-06-26

## 2024-06-26 RX ORDER — ONDANSETRON HYDROCHLORIDE 2 MG/ML
8 INJECTION, SOLUTION INTRAVENOUS ONCE
Status: CANCELLED | OUTPATIENT
Start: 2024-06-27 | End: 2024-06-27

## 2024-06-26 RX ORDER — DIPHENHYDRAMINE HYDROCHLORIDE 50 MG/ML
50 INJECTION INTRAMUSCULAR; INTRAVENOUS AS NEEDED
Status: DISCONTINUED | OUTPATIENT
Start: 2024-06-26 | End: 2024-06-26 | Stop reason: HOSPADM

## 2024-06-26 RX ORDER — DIPHENHYDRAMINE HYDROCHLORIDE 50 MG/ML
50 INJECTION INTRAMUSCULAR; INTRAVENOUS AS NEEDED
OUTPATIENT
Start: 2024-06-27

## 2024-06-26 RX ORDER — ALBUTEROL SULFATE 0.83 MG/ML
3 SOLUTION RESPIRATORY (INHALATION) AS NEEDED
OUTPATIENT
Start: 2024-06-27

## 2024-06-26 RX ORDER — FAMOTIDINE 10 MG/ML
20 INJECTION INTRAVENOUS ONCE AS NEEDED
Status: DISCONTINUED | OUTPATIENT
Start: 2024-06-26 | End: 2024-06-26 | Stop reason: HOSPADM

## 2024-06-26 NOTE — PROGRESS NOTES
Pt tolerated venofer infusion without incident. Declined post infusion observation period and vital signs.  Discharged home in stable condition

## 2024-09-03 ENCOUNTER — LAB (OUTPATIENT)
Dept: LAB | Facility: LAB | Age: 49
End: 2024-09-03
Payer: COMMERCIAL

## 2024-09-03 DIAGNOSIS — D64.9 ANEMIA, UNSPECIFIED TYPE: ICD-10-CM

## 2024-09-03 DIAGNOSIS — K91.2 POSTSURGICAL MALABSORPTION (HHS-HCC): ICD-10-CM

## 2024-09-03 DIAGNOSIS — K90.9 MALABSORPTION OF IRON (HHS-HCC): ICD-10-CM

## 2024-09-03 DIAGNOSIS — N93.9 ABNORMAL UTERINE BLEEDING: ICD-10-CM

## 2024-09-03 LAB
BASOPHILS # BLD AUTO: 0.11 X10*3/UL (ref 0–0.1)
BASOPHILS NFR BLD AUTO: 1.4 %
EOSINOPHIL # BLD AUTO: 0.18 X10*3/UL (ref 0–0.7)
EOSINOPHIL NFR BLD AUTO: 2.3 %
ERYTHROCYTE [DISTWIDTH] IN BLOOD BY AUTOMATED COUNT: 15.6 % (ref 11.5–14.5)
FERRITIN SERPL-MCNC: 178 NG/ML (ref 8–150)
HCT VFR BLD AUTO: 41.9 % (ref 36–46)
HGB BLD-MCNC: 14.3 G/DL (ref 12–16)
IMM GRANULOCYTES # BLD AUTO: 0.02 X10*3/UL (ref 0–0.7)
IMM GRANULOCYTES NFR BLD AUTO: 0.3 % (ref 0–0.9)
IRON SATN MFR SERPL: 34 % (ref 25–45)
IRON SERPL-MCNC: 129 UG/DL (ref 35–150)
LYMPHOCYTES # BLD AUTO: 2.31 X10*3/UL (ref 1.2–4.8)
LYMPHOCYTES NFR BLD AUTO: 29.8 %
MCH RBC QN AUTO: 31.3 PG (ref 26–34)
MCHC RBC AUTO-ENTMCNC: 34.1 G/DL (ref 32–36)
MCV RBC AUTO: 92 FL (ref 80–100)
MONOCYTES # BLD AUTO: 0.5 X10*3/UL (ref 0.1–1)
MONOCYTES NFR BLD AUTO: 6.4 %
NEUTROPHILS # BLD AUTO: 4.64 X10*3/UL (ref 1.2–7.7)
NEUTROPHILS NFR BLD AUTO: 59.8 %
NRBC BLD-RTO: 0 /100 WBCS (ref 0–0)
PLATELET # BLD AUTO: 442 X10*3/UL (ref 150–450)
RBC # BLD AUTO: 4.57 X10*6/UL (ref 4–5.2)
TIBC SERPL-MCNC: 377 UG/DL (ref 240–445)
UIBC SERPL-MCNC: 248 UG/DL (ref 110–370)
WBC # BLD AUTO: 7.8 X10*3/UL (ref 4.4–11.3)

## 2024-09-03 PROCEDURE — 83540 ASSAY OF IRON: CPT

## 2024-09-03 PROCEDURE — 85025 COMPLETE CBC W/AUTO DIFF WBC: CPT

## 2024-09-03 PROCEDURE — 83550 IRON BINDING TEST: CPT

## 2024-09-03 PROCEDURE — 36415 COLL VENOUS BLD VENIPUNCTURE: CPT

## 2024-09-03 PROCEDURE — 82728 ASSAY OF FERRITIN: CPT

## 2024-09-05 ENCOUNTER — TELEMEDICINE (OUTPATIENT)
Dept: HEMATOLOGY/ONCOLOGY | Facility: HOSPITAL | Age: 49
End: 2024-09-05
Payer: COMMERCIAL

## 2024-09-05 DIAGNOSIS — K91.2 POSTSURGICAL MALABSORPTION (HHS-HCC): ICD-10-CM

## 2024-09-05 DIAGNOSIS — N93.9 ABNORMAL UTERINE BLEEDING: ICD-10-CM

## 2024-09-05 DIAGNOSIS — K90.9 MALABSORPTION OF IRON (HHS-HCC): ICD-10-CM

## 2024-09-05 DIAGNOSIS — D64.9 ANEMIA, UNSPECIFIED TYPE: Primary | ICD-10-CM

## 2024-09-05 PROCEDURE — 99441 PR PHYS/QHP TELEPHONE EVALUATION 5-10 MIN: CPT | Performed by: PHYSICIAN ASSISTANT

## 2024-09-05 NOTE — PROGRESS NOTES
Patient ID: Debby Polanco is a 49 y.o. female.  Referring Physician: Atul Lewis PA-C  [id number] [place] [address]  Primary Care Provider: Monse Laura MD  Visit Type: Follow Up    Location: [facility] - Main  Diagnosis/Reason: KATERINA (2/2 post-surgical malabsorption s/p RYGB, AUB, Fibroids)    Verbal consent was requested and obtained from patient on this date for a telehealth visit.    Interval Hx  9/5/24  Debby Polanco is a 49 y.o. female following up today for KATERINA (2/2 post-surgical malabsorption s/p RYGB, AUB, uterine fibroids)    She presents today via virtual visit to discuss results from 2/22/24 visit    NOTE:  She has been followed by Dr. Louis for anemia to which he attributed to be d/t malabsorption 2/2 RYGB, GERD, uterine fibroids and menorrhagia.   At her 4/24/23 follow-up visit w/ Dr. Louis, she was ordered to have an additional cycle 2 doses of IV iron sucrose. She received those doses on 5/8/23 and 5/15/23 - She was also recommended  to follow-up w/ OB-GYN which she did on 5/31/23 where she was started on norethindrone and referred to radiology for possible uterine fibroid embolization  2/22/24 - She was last seen by Dr. Louis 8/31/23 and was ordered to receive 2 doses IV iron sucrose (Venofer) and to f/u 4 months later but did not  2/27/24 - Patient ordered to receive additional cycle IV iron sucrose (Venofer) at 300mg x 3 once weekly doses  6/4/24 - Patient ordered to receive additional cycle IV iron sucrose (Venofer) at 300mg x 3 once weekly doses as recent labs reveal iron deficiency  9/5/24 - Patient reports she was started on Metformin & Semaglutide recently. She also reports that her periods have shortened in duration to approximately 3 days    Patient was advised at last visit, that this provider would call her today at a time convenient to review results & provide time for questions and answers and that if patient was unavailable, a detailed message discussing results, plan of  care and contact information would be provided. Patient verbalized understanding. 2 attempts were made to reach patient at the available numbers in patient's EMR. Attempts were made at 10:00 and 10:27am. Per previous visit, patient provided this provider with permission to leave message on voicemail discussing results and plan of care. Voicemail left on patient's voicemail detailing results and plan of care. Left callback information with instructions to contact this clinic with any questions or concerns.    Relevant Hx  8/31/23 - Last Visit w/ Dr. Heriberto TIDWELL JAMES GOLDMAN is a 47 year old Female with a PMH significant for krystal-en-y gastric bypass for bariatric surgery, complicated by small bowel resections, uterine fibroids and menorrhagia, significant anemia since at least 3yrs, GERD, anxiety/depression  and h/o esophageal strictures who is referred for evaluation of her anemia. Presents with her sister.      Pt reports she was told she has aplastic anemia 3yrs back but has effectively received no treatment since then. Has instead gone to the ER for transfusions since the beginning of this year, last received 2U RBC in May. She had presented with chest pain,  SOB, lightheadedness. She also received venofer at [facility] ~6 months back. Her preceding labs over last few years has shown anisocytosis, microcytosis, with Hb 7-9, in 2020 had mixed KATERINA and AI, with normal B12. By the end of 2020 she  had become clearly iron deficient. She was seen by  in 2020 and there was a plan to give her IV iron, additionally there was note that she was also on sublingual B12. No follow up since then.      Reports chronic menorrhagia, with passage of large clots for yrs, and follows with gynecology at [place]. Pt reports that she does not take PO iron and her diet is very limited to a particular brand of mozzarella. She eats nothing else. She notes that  during her management for esophageal strictures, her  diet had to be limited and she has not been able to get herself to eat more despite no longer having any restrictions.     Does not have a family h/o anemia, but mother had thyroid and then breast cancer (70s), maternal grandmother had breast and lung cancer. Sister has been tested and is BRCA negative, pt has not been tested.      Other than menstrual bleeding has no other reported sources of bleeding. Has not had a mammo in the last 5yrs, never had a colo, last upper endoscopy was 3yrs back and was noted to have a hiatal hernia.    PMHx:  Active Ambulatory Problems     Diagnosis Date Noted    Anxiety 09/05/2023    Depression 09/05/2023    Hypokalemia 09/05/2023    Iron deficiency anemia secondary to inadequate dietary iron intake 09/05/2023    Abnormal uterine bleeding 09/28/2023    Anemia 09/28/2023    Fibroid 09/28/2023    Glossitis 09/28/2023    Oral candidiasis 09/28/2023    Pyelonephritis 09/28/2023    Urinary frequency 09/28/2023    Postsurgical malabsorption (HHS-HCC) 02/27/2024    Malabsorption of iron (HHS-HCC) 02/27/2024    Iron deficiency 02/27/2024     Resolved Ambulatory Problems     Diagnosis Date Noted    No Resolved Ambulatory Problems     Past Medical History:   Diagnosis Date    Low vitamin D level     Overweight     Personal history of other mental and behavioral disorders 07/17/2014    Personal history of other mental and behavioral disorders     Postmenopausal     Pure hypercholesterolemia     Sleep disorder, unspecified     Type 2 diabetes mellitus (Multi)        PSHx:  Past Surgical History:   Procedure Laterality Date    GASTRIC BYPASS  07/17/2014    Gastric Surgery For Morbid Obesity Bypass With Windy-en-Y       FHx:  Family History   Problem Relation Name Age of Onset    Thyroid cancer Mother      Breast cancer Mother      Hypertension Father      Cancer Other      Endometriosis Other      Diabetes Other      Hypertension Other         Social Hx:  Debby Polanco    reports that she has never  smoked. She has never been exposed to tobacco smoke. She has never used smokeless tobacco.  She  reports no history of alcohol use.  She  reports no history of drug use.  Social History     Socioeconomic History    Marital status: Single   Occupational History    Occupation: She is a retired nurse.   Tobacco Use    Smoking status: Never     Passive exposure: Never    Smokeless tobacco: Never   Substance and Sexual Activity    Alcohol use: Never    Drug use: Never      Occupation: Former nurse, now working in [employer]  Marital Status:   Alcohol Use: Denies  Smoking: Never smoker  Recreational Drug Use: Denies    Medications and allergies reviewed in EMR.    ROS:  Review of Systems - Oncology   10 point review of systems negative except as state in HPI.    Vitals & Statistics:  Objective   BSA: There is no height or weight on file to calculate BSA.  There were no vitals taken for this visit.    Physical Exam:  N/A - Virtual visit    Results:  Lab Results   Component Value Date    WBC 7.8 09/03/2024    NEUTROABS 4.64 09/03/2024    IGABSOL 0.02 09/03/2024    LYMPHSABS 2.31 09/03/2024    MONOSABS 0.50 09/03/2024    EOSABS 0.18 09/03/2024    BASOSABS 0.11 (H) 09/03/2024    RBC 4.57 09/03/2024    MCV 92 09/03/2024    MCHC 34.1 09/03/2024    HGB 14.3 09/03/2024    HCT 41.9 09/03/2024     09/03/2024     Lab Results   Component Value Date    RETICCTPCT 1.8 04/24/2023      Lab Results   Component Value Date    CREATININE 0.62 05/31/2024    BUN 10 05/31/2024    EGFR >90 05/31/2024     05/31/2024    K 3.8 05/31/2024     05/31/2024    CO2 27 05/31/2024      Lab Results   Component Value Date    ALT 7 05/31/2024    AST 12 05/31/2024    ALKPHOS 78 05/31/2024    BILITOT 0.2 05/31/2024      Lab Results   Component Value Date    TSH 3.97 04/25/2024     Lab Results   Component Value Date    TSH 3.97 04/25/2024     Lab Results   Component Value Date    IRON 129 09/03/2024    TIBC 377 09/03/2024    FERRITIN  "178 (H) 09/03/2024      Lab Results   Component Value Date    GAOTDGJH26 315 05/31/2024      Lab Results   Component Value Date    FOLATE 7.6 05/31/2024     No results found for: \"JANNIE\", \"RF\", \"SEDRATE\"   No results found for: \"CRP\"   No results found for: \"MAMTA\"  Lab Results   Component Value Date     07/25/2022     Lab Results   Component Value Date    HAPTOGLOBIN 105 07/25/2022     Lab Results   Component Value Date    SPEP NORMAL 07/25/2022     No results found for: \"IGG\", \"IGM\", \"IGA\"  No results found for: \"HEPATOT\", \"HEPAIGM\", \"HEPBCIGM\", \"HEPBCAB\", \"HEPBSAG\", \"HEPCAB\"  No results found for: \"HIV1X2\"    Assessment:  Debby Polanco is a 49 y.o. female following up today for KATERINA (2/2 post-surgical malabsorption s/p RYGB, AUB, uterine fibroids)     I reviewed patient's chart including but not limited to labs, imaging, surgical/procedure notes, pathology, hospital notes, doctor's notes.    9/5/24 results:  WBC count WNL - Basophilia improved to 0.11  RBC count, MCV, MCHC, H&H all WNL - RDW elevated - Recovering iron deficiency  Platelets WNL  Iron studies: Ferritin elevated at 178, Iron WNL at 129, TIBC WNL & %Sat WNL at 34% - Ferritin likely elevated 2/2 recent IV venofer infusions    Plan:  KATERINA (2/2 Post-surgical Malabsorption s/p RYGB, AUB, Uterine Fibroids)  No need for hematologic intervention at this time  RTC in 3 months via virtual visit w/ CBC-D, CMP, Iron studies up to 1 week prior - F/u sooner if needed/urgent  Plan for additional IV iron supplementation should patient be shown to be iron deficient    I had an extensive discussion with the patient regarding the diagnosis and discussed the plan of therapy, including general considerations regarding side effects and outcomes. Pt understood and gave appropriate teach back about the plan of care. All questions were answered to the patient's satisfaction. The patient is instructed to contact us at any time if questions or problems arise. Thank you for " the opportunity to participate in the care of this very pleasant patient.    Total time = 30 minutes. 50% or more of this time was spent in counseling and/or coordination of care including reviewing medical history/radiology/labs, examining patient, formulating outlined plan with team, and discussing plan with patient/family.    Atul Lewis PA-C

## 2024-09-17 DIAGNOSIS — F32.A DEPRESSION, UNSPECIFIED DEPRESSION TYPE: ICD-10-CM

## 2024-09-17 DIAGNOSIS — F41.9 ANXIETY: ICD-10-CM

## 2024-09-17 DIAGNOSIS — E11.9 TYPE 2 DIABETES MELLITUS WITHOUT COMPLICATION, WITHOUT LONG-TERM CURRENT USE OF INSULIN (MULTI): ICD-10-CM

## 2024-09-17 RX ORDER — BUPROPION HYDROCHLORIDE 150 MG/1
150 TABLET, EXTENDED RELEASE ORAL 2 TIMES DAILY
Qty: 180 TABLET | Refills: 0 | Status: SHIPPED | OUTPATIENT
Start: 2024-09-17

## 2024-09-17 RX ORDER — METFORMIN HYDROCHLORIDE 1000 MG/1
1000 TABLET ORAL
Qty: 180 TABLET | Refills: 0 | Status: SHIPPED | OUTPATIENT
Start: 2024-09-17

## 2024-12-30 DIAGNOSIS — E11.9 TYPE 2 DIABETES MELLITUS WITHOUT COMPLICATION, WITHOUT LONG-TERM CURRENT USE OF INSULIN (MULTI): ICD-10-CM

## 2025-01-01 RX ORDER — METFORMIN HYDROCHLORIDE 1000 MG/1
1000 TABLET ORAL
Qty: 60 TABLET | Refills: 0 | Status: SHIPPED | OUTPATIENT
Start: 2025-01-01

## 2025-01-27 ENCOUNTER — APPOINTMENT (OUTPATIENT)
Dept: PRIMARY CARE | Facility: CLINIC | Age: 50
End: 2025-01-27
Payer: MEDICAID

## 2025-01-27 VITALS — HEIGHT: 62 IN | BODY MASS INDEX: 28.16 KG/M2 | WEIGHT: 153 LBS

## 2025-01-27 DIAGNOSIS — F32.A DEPRESSION, UNSPECIFIED DEPRESSION TYPE: ICD-10-CM

## 2025-01-27 DIAGNOSIS — R21 RASH: Primary | ICD-10-CM

## 2025-01-27 DIAGNOSIS — E78.00 HIGH CHOLESTEROL: ICD-10-CM

## 2025-01-27 DIAGNOSIS — F41.9 ANXIETY: ICD-10-CM

## 2025-01-27 DIAGNOSIS — F51.01 PRIMARY INSOMNIA: ICD-10-CM

## 2025-01-27 DIAGNOSIS — E11.9 TYPE 2 DIABETES MELLITUS WITHOUT COMPLICATION, WITHOUT LONG-TERM CURRENT USE OF INSULIN (MULTI): ICD-10-CM

## 2025-01-27 DIAGNOSIS — Z78.0 POSTMENOPAUSAL: ICD-10-CM

## 2025-01-27 DIAGNOSIS — D61.9 APLASTIC ANEMIA, UNSPECIFIED (MULTI): ICD-10-CM

## 2025-01-27 DIAGNOSIS — R53.83 OTHER FATIGUE: ICD-10-CM

## 2025-01-27 PROCEDURE — 3008F BODY MASS INDEX DOCD: CPT | Performed by: INTERNAL MEDICINE

## 2025-01-27 PROCEDURE — 99214 OFFICE O/P EST MOD 30 MIN: CPT | Performed by: INTERNAL MEDICINE

## 2025-01-27 RX ORDER — METFORMIN HYDROCHLORIDE 1000 MG/1
1000 TABLET ORAL
Qty: 180 TABLET | Refills: 0 | Status: SHIPPED | OUTPATIENT
Start: 2025-01-27

## 2025-01-27 RX ORDER — TRAZODONE HYDROCHLORIDE 50 MG/1
50 TABLET ORAL NIGHTLY PRN
Qty: 90 TABLET | Refills: 0 | Status: SHIPPED | OUTPATIENT
Start: 2025-01-27 | End: 2026-01-27

## 2025-01-27 RX ORDER — CLOTRIMAZOLE 1 %
CREAM (GRAM) TOPICAL 2 TIMES DAILY
Qty: 30 G | Refills: 0 | Status: SHIPPED | OUTPATIENT
Start: 2025-01-27 | End: 2025-05-27

## 2025-01-27 RX ORDER — BUPROPION HYDROCHLORIDE 150 MG/1
150 TABLET, EXTENDED RELEASE ORAL 2 TIMES DAILY
Qty: 180 TABLET | Refills: 0 | Status: SHIPPED | OUTPATIENT
Start: 2025-01-27

## 2025-01-27 RX ORDER — ECONAZOLE NITRATE 10 MG/G
CREAM TOPICAL 2 TIMES DAILY PRN
Qty: 30 G | Refills: 0 | Status: SHIPPED | OUTPATIENT
Start: 2025-01-27 | End: 2025-05-27

## 2025-01-27 ASSESSMENT — ENCOUNTER SYMPTOMS
DEPRESSION: 0
OCCASIONAL FEELINGS OF UNSTEADINESS: 0
LOSS OF SENSATION IN FEET: 0

## 2025-01-28 PROBLEM — D61.9 APLASTIC ANEMIA, UNSPECIFIED (MULTI): Status: ACTIVE | Noted: 2025-01-28

## 2025-01-28 NOTE — PROGRESS NOTES
"Subjective   Patient ID: Debby Polanco is a 49 y.o. female who presents for multiple medical issues.    Debby Polanco today came here for multiple medical issues.  1. She told me for type 2 diabetes and weight she is taking metformin, too much diarrhea, abdominal pain.  She had a gastric bypass in place.  2. She got ______ (in contact with) ringworm on the back ______.  She is concerned.  Appetite and weight are okay.  No problem.    I have personally reviewed the patient's Past Medical History, Medications, Allergies, Social History, and Family History in the EMR.    Review of Systems   All other systems reviewed and are negative.    Objective   Ht 1.575 m (5' 2\")   Wt 69.4 kg (153 lb)   BMI 27.98 kg/m²     Physical Exam  Vitals reviewed.   Cardiovascular:      Heart sounds: Normal heart sounds, S1 normal and S2 normal. No murmur heard.     No friction rub.   Pulmonary:      Effort: Pulmonary effort is normal.      Breath sounds: Normal breath sounds and air entry.   Abdominal:      Palpations: There is no hepatomegaly, splenomegaly or mass.   Musculoskeletal:      Right lower leg: No edema.      Left lower leg: No edema.   Lymphadenopathy:      Lower Body: No right inguinal adenopathy. No left inguinal adenopathy.   Skin:     Comments: Ring worm present.   Neurological:      Cranial Nerves: Cranial nerves 2-12 are intact.      Sensory: No sensory deficit.      Motor: Motor function is intact.      Deep Tendon Reflexes: Reflexes are normal and symmetric.     LAB WORK: Laboratory testing discussed.    Assessment/Plan   Problem List Items Addressed This Visit             ICD-10-CM       Hematology and Neoplasia    Aplastic anemia, unspecified (Multi) D61.9       Mental Health    Anxiety F41.9    Relevant Medications    buPROPion SR (Wellbutrin SR) 150 mg 12 hr tablet    Depression F32.A    Relevant Medications    buPROPion SR (Wellbutrin SR) 150 mg 12 hr tablet     Other Visit Diagnoses         Codes    Rash    -  " Primary R21    Relevant Medications    econazole nitrate 1 % cream    clotrimazole (Lotrimin) 1 % cream    Type 2 diabetes mellitus without complication, without long-term current use of insulin (Multi)     E11.9    Relevant Medications    metFORMIN (Glucophage) 1,000 mg tablet    Other Relevant Orders    Hemoglobin A1C    Primary insomnia     F51.01    Relevant Medications    traZODone (Desyrel) 50 mg tablet    High cholesterol     E78.00    Relevant Orders    Comprehensive Metabolic Panel    Lipid Panel    Other fatigue     R53.83    Relevant Orders    CBC    Thyroid Stimulating Hormone    Urinalysis with Reflex Microscopic    Postmenopausal     Z78.0        1. Ring worm.  Spectazole 1% cream.  2. Type 2 diabetes.  Check hemoglobin A1c. Metformin causing problem.  She might be a candidate for semaglutide, she has taken in the past with good results in the past.  3. Cholesterol.  Monitor.  4. Postmenopausal.  Take calcium.  5. Follow-up appointment with me in a week after the tests.  6. Continue to follow.    Bimal Attestation  By signing my name below, IBarby Scribe attest that this documentation has been prepared under the direction and in the presence of Monse Laura MD.   All medical record entries made by the abdulazizibpete were personally dictated by me I have reviewed the chart and agree the record accurately reflects my personal performance of his history physical examination and management

## 2025-01-31 LAB
ALBUMIN SERPL-MCNC: 4.5 G/DL (ref 3.6–5.1)
ALP SERPL-CCNC: 65 U/L (ref 31–125)
ALT SERPL-CCNC: 9 U/L (ref 6–29)
ANION GAP SERPL CALCULATED.4IONS-SCNC: 14 MMOL/L (CALC) (ref 7–17)
APPEARANCE UR: CLEAR
AST SERPL-CCNC: 11 U/L (ref 10–35)
BACTERIA #/AREA URNS HPF: ABNORMAL /HPF
BILIRUB SERPL-MCNC: 0.2 MG/DL (ref 0.2–1.2)
BILIRUB UR QL STRIP: NEGATIVE
BUN SERPL-MCNC: 15 MG/DL (ref 7–25)
CALCIUM SERPL-MCNC: 9.1 MG/DL (ref 8.6–10.2)
CHLORIDE SERPL-SCNC: 106 MMOL/L (ref 98–110)
CHOLEST SERPL-MCNC: 172 MG/DL
CHOLEST/HDLC SERPL: 3.3 (CALC)
CO2 SERPL-SCNC: 20 MMOL/L (ref 20–32)
COLOR UR: ABNORMAL
CREAT SERPL-MCNC: 0.64 MG/DL (ref 0.5–0.99)
EGFRCR SERPLBLD CKD-EPI 2021: 108 ML/MIN/1.73M2
ERYTHROCYTE [DISTWIDTH] IN BLOOD BY AUTOMATED COUNT: 11.7 % (ref 11–15)
EST. AVERAGE GLUCOSE BLD GHB EST-MCNC: 94 MG/DL
EST. AVERAGE GLUCOSE BLD GHB EST-SCNC: 5.2 MMOL/L
GLUCOSE SERPL-MCNC: 70 MG/DL (ref 65–99)
GLUCOSE UR QL STRIP: NEGATIVE
HBA1C MFR BLD: 4.9 % OF TOTAL HGB
HCT VFR BLD AUTO: 41.4 % (ref 35–45)
HDLC SERPL-MCNC: 52 MG/DL
HGB BLD-MCNC: 13.7 G/DL (ref 11.7–15.5)
HGB UR QL STRIP: NEGATIVE
HYALINE CASTS #/AREA URNS LPF: ABNORMAL /LPF
KETONES UR QL STRIP: ABNORMAL
LDLC SERPL CALC-MCNC: 92 MG/DL (CALC)
LEUKOCYTE ESTERASE UR QL STRIP: NEGATIVE
MCH RBC QN AUTO: 30.9 PG (ref 27–33)
MCHC RBC AUTO-ENTMCNC: 33.1 G/DL (ref 32–36)
MCV RBC AUTO: 93.5 FL (ref 80–100)
NITRITE UR QL STRIP: NEGATIVE
NONHDLC SERPL-MCNC: 120 MG/DL (CALC)
PH UR STRIP: 5.5 [PH] (ref 5–8)
PLATELET # BLD AUTO: 316 THOUSAND/UL (ref 140–400)
PMV BLD REES-ECKER: 10.3 FL (ref 7.5–12.5)
POTASSIUM SERPL-SCNC: 3.8 MMOL/L (ref 3.5–5.3)
PROT SERPL-MCNC: 6.5 G/DL (ref 6.1–8.1)
PROT UR QL STRIP: ABNORMAL
RBC # BLD AUTO: 4.43 MILLION/UL (ref 3.8–5.1)
RBC #/AREA URNS HPF: ABNORMAL /HPF
SERVICE CMNT-IMP: ABNORMAL
SODIUM SERPL-SCNC: 140 MMOL/L (ref 135–146)
SP GR UR STRIP: 1.04 (ref 1–1.03)
SQUAMOUS #/AREA URNS HPF: ABNORMAL /HPF
TRIGL SERPL-MCNC: 188 MG/DL
TSH SERPL-ACNC: 2.2 MIU/L
WBC # BLD AUTO: 7.7 THOUSAND/UL (ref 3.8–10.8)
WBC #/AREA URNS HPF: ABNORMAL /HPF

## 2025-03-27 DIAGNOSIS — E78.00 HIGH CHOLESTEROL: ICD-10-CM

## 2025-03-27 DIAGNOSIS — E11.9 TYPE 2 DIABETES MELLITUS WITHOUT COMPLICATION, WITHOUT LONG-TERM CURRENT USE OF INSULIN: ICD-10-CM

## 2025-03-27 DIAGNOSIS — R53.83 OTHER FATIGUE: ICD-10-CM

## 2025-04-18 DIAGNOSIS — E11.9 TYPE 2 DIABETES MELLITUS WITHOUT COMPLICATION, WITHOUT LONG-TERM CURRENT USE OF INSULIN: ICD-10-CM

## 2025-04-18 RX ORDER — METFORMIN HYDROCHLORIDE 1000 MG/1
1000 TABLET ORAL
Qty: 180 TABLET | Refills: 0 | Status: SHIPPED | OUTPATIENT
Start: 2025-04-18

## 2025-05-01 ENCOUNTER — OFFICE VISIT (OUTPATIENT)
Dept: PRIMARY CARE | Facility: CLINIC | Age: 50
End: 2025-05-01
Payer: MEDICAID

## 2025-05-01 VITALS
BODY MASS INDEX: 29.08 KG/M2 | HEIGHT: 62 IN | WEIGHT: 158 LBS | SYSTOLIC BLOOD PRESSURE: 124 MMHG | DIASTOLIC BLOOD PRESSURE: 76 MMHG

## 2025-05-01 DIAGNOSIS — E11.9 TYPE 2 DIABETES MELLITUS WITHOUT COMPLICATION, WITHOUT LONG-TERM CURRENT USE OF INSULIN: ICD-10-CM

## 2025-05-01 DIAGNOSIS — L30.9 DERMATITIS: ICD-10-CM

## 2025-05-01 DIAGNOSIS — F32.A DEPRESSION, UNSPECIFIED DEPRESSION TYPE: ICD-10-CM

## 2025-05-01 DIAGNOSIS — R73.03 PRE-DIABETES: ICD-10-CM

## 2025-05-01 DIAGNOSIS — E66.3 OVERWEIGHT: Primary | ICD-10-CM

## 2025-05-01 DIAGNOSIS — F41.9 ANXIETY: ICD-10-CM

## 2025-05-01 PROCEDURE — 3008F BODY MASS INDEX DOCD: CPT | Performed by: INTERNAL MEDICINE

## 2025-05-01 PROCEDURE — 3074F SYST BP LT 130 MM HG: CPT | Performed by: INTERNAL MEDICINE

## 2025-05-01 PROCEDURE — 99213 OFFICE O/P EST LOW 20 MIN: CPT | Performed by: INTERNAL MEDICINE

## 2025-05-01 PROCEDURE — 3078F DIAST BP <80 MM HG: CPT | Performed by: INTERNAL MEDICINE

## 2025-05-01 RX ORDER — BUPROPION HYDROCHLORIDE 150 MG/1
150 TABLET, EXTENDED RELEASE ORAL 2 TIMES DAILY
Qty: 180 TABLET | Refills: 0 | Status: SHIPPED | OUTPATIENT
Start: 2025-05-01

## 2025-05-01 RX ORDER — METFORMIN HYDROCHLORIDE 1000 MG/1
1000 TABLET ORAL
Qty: 180 TABLET | Refills: 0 | Status: SHIPPED | OUTPATIENT
Start: 2025-05-01

## 2025-05-01 NOTE — PROGRESS NOTES
"Subjective   Patient ID: Debby Polanco is a 49 y.o. female who presents for Follow-up (Multiple medical issues).    Debby today came here for multiple medical issues.  She wants to lose weight.  Six months ago, she took the Wegovy, that helped.  Generic semaglutide she wants to go back.  She has gained her weight back.  She wants it for maintenance as well.  Overall, she is feeling okay.  No problem.  She had a blood work done in January.    I have personally reviewed the patient's Past Medical History, Medications, Allergies, Social History, and Family History in the EMR.    Review of Systems   All other systems reviewed and are negative.    Objective   /76   Ht 1.575 m (5' 2\")   Wt 71.7 kg (158 lb)   BMI 28.90 kg/m²     Physical Exam  Vitals reviewed.   Cardiovascular:      Heart sounds: Normal heart sounds, S1 normal and S2 normal. No murmur heard.     No friction rub.   Pulmonary:      Effort: Pulmonary effort is normal.      Breath sounds: Normal breath sounds and air entry.   Abdominal:      Palpations: There is no hepatomegaly, splenomegaly or mass.   Musculoskeletal:      Right lower leg: No edema.      Left lower leg: No edema.   Lymphadenopathy:      Lower Body: No right inguinal adenopathy. No left inguinal adenopathy.   Neurological:      Cranial Nerves: Cranial nerves 2-12 are intact.      Sensory: No sensory deficit.      Motor: Motor function is intact.      Deep Tendon Reflexes: Reflexes are normal and symmetric.     LAB WORK: Laboratory testing discussed.    Assessment/Plan   Problem List Items Addressed This Visit           ICD-10-CM    Anxiety F41.9    Relevant Medications    buPROPion SR (Wellbutrin SR) 150 mg 12 hr tablet    Depression F32.A    Relevant Medications    buPROPion SR (Wellbutrin SR) 150 mg 12 hr tablet     Other Visit Diagnoses         Codes      Overweight    -  Primary E66.3      Type 2 diabetes mellitus without complication, without long-term current use of insulin     " E11.9    Relevant Medications    metFORMIN (Glucophage) 1,000 mg tablet      Pre-diabetes     R73.03      Dermatitis     L30.9        1. Overweight.  Semaglutide helping.  She wants to start at 0.5 mg.  2. Pre-diabetic.  Given she wants to still go compounding route, I told her pros and cons.  She wants to do that.  Continue.  ______ (Continue metformin. // PER PREVIOUS REPORT: Metformin causing problem.)  3. Anxiety and depression, stable.  4. Dermatitis, stable.  5. Follow-up in a month.    Scribe Attestation  By signing my name below, I, Bimal Batista attest that this documentation has been prepared under the direction and in the presence of Monse Laura MD.     All medical record entries made by the scribe were personally dictated by me I have reviewed the chart and agree the record accurately reflects my personal performance of his history physical examination and management

## 2025-06-10 ENCOUNTER — APPOINTMENT (OUTPATIENT)
Dept: PRIMARY CARE | Facility: CLINIC | Age: 50
End: 2025-06-10
Payer: MEDICAID

## 2025-06-10 VITALS
SYSTOLIC BLOOD PRESSURE: 126 MMHG | DIASTOLIC BLOOD PRESSURE: 78 MMHG | WEIGHT: 151 LBS | BODY MASS INDEX: 27.79 KG/M2 | HEIGHT: 62 IN

## 2025-06-10 DIAGNOSIS — E78.00 HIGH CHOLESTEROL: ICD-10-CM

## 2025-06-10 DIAGNOSIS — F41.9 ANXIETY AND DEPRESSION: ICD-10-CM

## 2025-06-10 DIAGNOSIS — R73.03 PREDIABETES: Primary | ICD-10-CM

## 2025-06-10 DIAGNOSIS — R53.83 OTHER FATIGUE: ICD-10-CM

## 2025-06-10 DIAGNOSIS — D64.9 ANEMIA, UNSPECIFIED TYPE: ICD-10-CM

## 2025-06-10 DIAGNOSIS — E11.9 TYPE 2 DIABETES MELLITUS WITHOUT COMPLICATION, WITHOUT LONG-TERM CURRENT USE OF INSULIN: ICD-10-CM

## 2025-06-10 DIAGNOSIS — F32.A ANXIETY AND DEPRESSION: ICD-10-CM

## 2025-06-10 DIAGNOSIS — R11.0 NAUSEA: ICD-10-CM

## 2025-06-10 PROCEDURE — 99214 OFFICE O/P EST MOD 30 MIN: CPT | Performed by: INTERNAL MEDICINE

## 2025-06-10 PROCEDURE — 3008F BODY MASS INDEX DOCD: CPT | Performed by: INTERNAL MEDICINE

## 2025-06-10 PROCEDURE — 3078F DIAST BP <80 MM HG: CPT | Performed by: INTERNAL MEDICINE

## 2025-06-10 PROCEDURE — 3074F SYST BP LT 130 MM HG: CPT | Performed by: INTERNAL MEDICINE

## 2025-06-10 RX ORDER — ONDANSETRON 4 MG/1
4 TABLET, ORALLY DISINTEGRATING ORAL EVERY 8 HOURS PRN
Qty: 20 TABLET | Refills: 0 | Status: SHIPPED | OUTPATIENT
Start: 2025-06-10 | End: 2025-06-17

## 2025-06-11 NOTE — PROGRESS NOTES
"Subjective   Patient ID: Debby Polanco is a 49 y.o. female who presents for Follow-up (Multiple medical issues).    Ms. Polanco today came here for multiple medical issues.  Overall, she is okay.  1. She is pre-diabetic, family history and weight.  She is on Semaglutide along with metformin doing okay.  2. She wants to have a new hematologist.  3. She came here for follow-up on various conditions.  Appetite and weight are okay.  No problem.    I have personally reviewed the patient's Past Medical History, Medications, Allergies, Social History, and Family History in the EMR.    Review of Systems   All other systems reviewed and are negative.    Objective   /78   Ht 1.575 m (5' 2\")   Wt 68.5 kg (151 lb)   BMI 27.62 kg/m²     Physical Exam  Vitals reviewed.   Cardiovascular:      Heart sounds: Normal heart sounds, S1 normal and S2 normal. No murmur heard.     No friction rub.   Pulmonary:      Effort: Pulmonary effort is normal.      Breath sounds: Normal breath sounds and air entry.   Abdominal:      Palpations: There is no hepatomegaly, splenomegaly or mass.   Musculoskeletal:      Right lower leg: No edema.      Left lower leg: No edema.   Lymphadenopathy:      Lower Body: No right inguinal adenopathy. No left inguinal adenopathy.   Neurological:      Cranial Nerves: Cranial nerves 2-12 are intact.      Sensory: No sensory deficit.      Motor: Motor function is intact.      Deep Tendon Reflexes: Reflexes are normal and symmetric.     LAB WORK:  Laboratory testing discussed.    Assessment/Plan   Problem List Items Addressed This Visit           ICD-10-CM    Anemia D64.9    Relevant Orders    Referral To Hematology and Oncology     Other Visit Diagnoses         Codes      Prediabetes    -  Primary R73.03      Type 2 diabetes mellitus without complication, without long-term current use of insulin     E11.9    Relevant Medications    semaglutide (OZEMPIC) 1 mg/dose (4 mg/3 mL) pen injector (Start on 6/15/2025)    " Other Relevant Orders    Hemoglobin A1C      High cholesterol     E78.00    Relevant Orders    Comprehensive Metabolic Panel    Lipid Panel      Other fatigue     R53.83    Relevant Orders    CBC    Thyroid Stimulating Hormone    Urinalysis with Reflex Culture and Microscopic      Nausea     R11.0    Relevant Medications    ondansetron ODT (Zofran-ODT) 4 mg disintegrating tablet      Anxiety and depression     F41.9, F32.A        1. Pre-diabetic and weight issue.  She is on metformin, Ozempic, we are going to do 1 mg.  She gets nausea, Zofran given.  2. ______ anemia.  She is going to see a specialist.  Refer to  ______.  3. Anxiety and depression, stable.  4. Follow-up appointment with me in four weeks with repeat blood work.    Scribe Attestation  By signing my name below, IBarby Scribe attest that this documentation has been prepared under the direction and in the presence of Monse Laura MD.     All medical record entries made by the scribe were personally dictated by me I have reviewed the chart and agree the record accurately reflects my personal performance of his history physical examination and management

## 2025-06-20 ENCOUNTER — APPOINTMENT (OUTPATIENT)
Dept: HEMATOLOGY/ONCOLOGY | Facility: CLINIC | Age: 50
End: 2025-06-20
Payer: MEDICAID

## 2025-06-25 DIAGNOSIS — Z12.31 ENCOUNTER FOR SCREENING MAMMOGRAM FOR BREAST CANCER: ICD-10-CM

## 2025-07-09 LAB
ALBUMIN SERPL-MCNC: 4.5 G/DL (ref 3.6–5.1)
ALP SERPL-CCNC: 55 U/L (ref 37–153)
ALT SERPL-CCNC: 11 U/L (ref 6–29)
ANION GAP SERPL CALCULATED.4IONS-SCNC: 9 MMOL/L (CALC) (ref 7–17)
APPEARANCE UR: CLEAR
AST SERPL-CCNC: 14 U/L (ref 10–35)
BACTERIA #/AREA URNS HPF: ABNORMAL /HPF
BACTERIA UR CULT: ABNORMAL
BACTERIA UR CULT: ABNORMAL
BILIRUB SERPL-MCNC: 0.3 MG/DL (ref 0.2–1.2)
BILIRUB UR QL STRIP: NEGATIVE
BUN SERPL-MCNC: 15 MG/DL (ref 7–25)
CALCIUM SERPL-MCNC: 9.8 MG/DL (ref 8.6–10.4)
CAOX CRY #/AREA URNS HPF: ABNORMAL /HPF
CHLORIDE SERPL-SCNC: 104 MMOL/L (ref 98–110)
CHOLEST SERPL-MCNC: 196 MG/DL
CHOLEST/HDLC SERPL: 4 (CALC)
CO2 SERPL-SCNC: 27 MMOL/L (ref 20–32)
COLOR UR: ABNORMAL
CREAT SERPL-MCNC: 0.8 MG/DL (ref 0.5–1.03)
EGFRCR SERPLBLD CKD-EPI 2021: 90 ML/MIN/1.73M2
ERYTHROCYTE [DISTWIDTH] IN BLOOD BY AUTOMATED COUNT: 12.9 % (ref 11–15)
EST. AVERAGE GLUCOSE BLD GHB EST-MCNC: 100 MG/DL
EST. AVERAGE GLUCOSE BLD GHB EST-SCNC: 5.5 MMOL/L
GLUCOSE SERPL-MCNC: 86 MG/DL (ref 65–99)
GLUCOSE UR QL STRIP: NEGATIVE
HBA1C MFR BLD: 5.1 %
HCT VFR BLD AUTO: 38.8 % (ref 35–45)
HDLC SERPL-MCNC: 49 MG/DL
HGB BLD-MCNC: 12.8 G/DL (ref 11.7–15.5)
HGB UR QL STRIP: NEGATIVE
HYALINE CASTS #/AREA URNS LPF: ABNORMAL /LPF
KETONES UR QL STRIP: ABNORMAL
LDLC SERPL CALC-MCNC: 123 MG/DL (CALC)
LEUKOCYTE ESTERASE UR QL STRIP: ABNORMAL
MCH RBC QN AUTO: 29.9 PG (ref 27–33)
MCHC RBC AUTO-ENTMCNC: 33 G/DL (ref 32–36)
MCV RBC AUTO: 90.7 FL (ref 80–100)
NITRITE UR QL STRIP: NEGATIVE
NONHDLC SERPL-MCNC: 147 MG/DL (CALC)
PH UR STRIP: 5.5 [PH] (ref 5–8)
PLATELET # BLD AUTO: 296 THOUSAND/UL (ref 140–400)
PMV BLD REES-ECKER: 9.8 FL (ref 7.5–12.5)
POTASSIUM SERPL-SCNC: 3.5 MMOL/L (ref 3.5–5.3)
PROT SERPL-MCNC: 6.6 G/DL (ref 6.1–8.1)
PROT UR QL STRIP: ABNORMAL
RBC # BLD AUTO: 4.28 MILLION/UL (ref 3.8–5.1)
RBC #/AREA URNS HPF: ABNORMAL /HPF
SERVICE CMNT-IMP: ABNORMAL
SODIUM SERPL-SCNC: 140 MMOL/L (ref 135–146)
SP GR UR STRIP: 1.03 (ref 1–1.03)
SQUAMOUS #/AREA URNS HPF: ABNORMAL /HPF
TRIGL SERPL-MCNC: 127 MG/DL
TSH SERPL-ACNC: 1.13 MIU/L
WBC # BLD AUTO: 8.7 THOUSAND/UL (ref 3.8–10.8)
WBC #/AREA URNS HPF: ABNORMAL /HPF

## 2025-07-14 ENCOUNTER — LAB (OUTPATIENT)
Dept: LAB | Facility: CLINIC | Age: 50
End: 2025-07-14
Payer: MEDICAID

## 2025-07-14 ENCOUNTER — OFFICE VISIT (OUTPATIENT)
Dept: HEMATOLOGY/ONCOLOGY | Facility: CLINIC | Age: 50
End: 2025-07-14
Payer: MEDICAID

## 2025-07-14 VITALS
DIASTOLIC BLOOD PRESSURE: 82 MMHG | WEIGHT: 147.6 LBS | SYSTOLIC BLOOD PRESSURE: 134 MMHG | BODY MASS INDEX: 27 KG/M2 | TEMPERATURE: 97.5 F | HEART RATE: 70 BPM | RESPIRATION RATE: 18 BRPM | OXYGEN SATURATION: 97 %

## 2025-07-14 DIAGNOSIS — E61.1 IRON DEFICIENCY: ICD-10-CM

## 2025-07-14 DIAGNOSIS — D64.9 ANEMIA, UNSPECIFIED TYPE: ICD-10-CM

## 2025-07-14 DIAGNOSIS — E61.1 IRON DEFICIENCY: Primary | ICD-10-CM

## 2025-07-14 LAB
ALBUMIN SERPL BCP-MCNC: 4.4 G/DL (ref 3.4–5)
ALP SERPL-CCNC: 61 U/L (ref 33–110)
ALT SERPL W P-5'-P-CCNC: 12 U/L (ref 7–45)
ANION GAP SERPL CALC-SCNC: 12 MMOL/L (ref 10–20)
AST SERPL W P-5'-P-CCNC: 13 U/L (ref 9–39)
BASOPHILS # BLD AUTO: 0.05 X10*3/UL (ref 0–0.1)
BASOPHILS NFR BLD AUTO: 1 %
BILIRUB SERPL-MCNC: 0.3 MG/DL (ref 0–1.2)
BUN SERPL-MCNC: 14 MG/DL (ref 6–23)
CALCIUM SERPL-MCNC: 9 MG/DL (ref 8.6–10.3)
CHLORIDE SERPL-SCNC: 105 MMOL/L (ref 98–107)
CO2 SERPL-SCNC: 28 MMOL/L (ref 21–32)
CREAT SERPL-MCNC: 0.62 MG/DL (ref 0.5–1.05)
EGFRCR SERPLBLD CKD-EPI 2021: >90 ML/MIN/1.73M*2
EOSINOPHIL # BLD AUTO: 0.07 X10*3/UL (ref 0–0.7)
EOSINOPHIL NFR BLD AUTO: 1.4 %
ERYTHROCYTE [DISTWIDTH] IN BLOOD BY AUTOMATED COUNT: 12.8 % (ref 11.5–14.5)
GLUCOSE SERPL-MCNC: 96 MG/DL (ref 74–99)
HCT VFR BLD AUTO: 36.6 % (ref 36–46)
HGB BLD-MCNC: 12.7 G/DL (ref 12–16)
IMM GRANULOCYTES # BLD AUTO: 0 X10*3/UL (ref 0–0.7)
IMM GRANULOCYTES NFR BLD AUTO: 0 % (ref 0–0.9)
LYMPHOCYTES # BLD AUTO: 1.63 X10*3/UL (ref 1.2–4.8)
LYMPHOCYTES NFR BLD AUTO: 31.8 %
MCH RBC QN AUTO: 29.9 PG (ref 26–34)
MCHC RBC AUTO-ENTMCNC: 34.7 G/DL (ref 32–36)
MCV RBC AUTO: 86 FL (ref 80–100)
MONOCYTES # BLD AUTO: 0.38 X10*3/UL (ref 0.1–1)
MONOCYTES NFR BLD AUTO: 7.4 %
NEUTROPHILS # BLD AUTO: 3 X10*3/UL (ref 1.2–7.7)
NEUTROPHILS NFR BLD AUTO: 58.4 %
NRBC BLD-RTO: 0 /100 WBCS (ref 0–0)
PLATELET # BLD AUTO: 287 X10*3/UL (ref 150–450)
POTASSIUM SERPL-SCNC: 3.4 MMOL/L (ref 3.5–5.3)
PROT SERPL-MCNC: 6.6 G/DL (ref 6.4–8.2)
RBC # BLD AUTO: 4.25 X10*6/UL (ref 4–5.2)
SODIUM SERPL-SCNC: 142 MMOL/L (ref 136–145)
WBC # BLD AUTO: 5.1 X10*3/UL (ref 4.4–11.3)

## 2025-07-14 PROCEDURE — 36415 COLL VENOUS BLD VENIPUNCTURE: CPT

## 2025-07-14 PROCEDURE — 84075 ASSAY ALKALINE PHOSPHATASE: CPT

## 2025-07-14 PROCEDURE — 85025 COMPLETE CBC W/AUTO DIFF WBC: CPT

## 2025-07-14 PROCEDURE — 99215 OFFICE O/P EST HI 40 MIN: CPT | Performed by: INTERNAL MEDICINE

## 2025-07-14 PROCEDURE — 82607 VITAMIN B-12: CPT

## 2025-07-14 PROCEDURE — 82746 ASSAY OF FOLIC ACID SERUM: CPT

## 2025-07-14 PROCEDURE — 83540 ASSAY OF IRON: CPT

## 2025-07-14 PROCEDURE — 82728 ASSAY OF FERRITIN: CPT

## 2025-07-14 RX ORDER — FERROUS SULFATE 325(65) MG
325 TABLET, DELAYED RELEASE (ENTERIC COATED) ORAL
COMMUNITY

## 2025-07-14 ASSESSMENT — PAIN SCALES - GENERAL: PAINLEVEL_OUTOF10: 0-NO PAIN

## 2025-07-14 NOTE — PROGRESS NOTES
Patient ID: Debby Polanco is a 50 y.o. female.  Referring Physician: Monse Laura MD  [address] [place] [address]  [facility], [address]  [place] [address]  Primary Care Provider: Monse Laura MD  Visit Type: Follow Up    Location: [facility] - Main  Diagnosis/Reason: KATERINA (2/2 post-surgical malabsorption s/p RYGB, AUB, Fibroids)    Interval Hx  07/11/25  Debby Polanco is a 50 y.o. female following up today for KATERINA (2/2 post-surgical malabsorption s/p RYGB, AUB, uterine fibroids)  Patient denies weight loss, abnormal bruising and bleeding, hematuria, blood in stool, dark/black stools, epistaxis, oral/gingival bleeding, lymphadenopathy, recurrent infections, recurrent fevers, night sweats, early satiety, abdominal pain, bone pain, chest pain, palpitations, SOB, STILL, fatigue, dizziness, lightheadedness, PICA.     NOTE:  She has been followed by Dr. Louis for anemia to which he attributed to be d/t malabsorption 2/2 RYGB, GERD, uterine fibroids and menorrhagia.   At her 4/24/23 follow-up visit w/ Dr. Louis, she was ordered to have an additional cycle 2 doses of IV iron sucrose. She received those doses on 5/8/23 and 5/15/23 - She was also recommended  to follow-up w/ OB-GYN which she did on 5/31/23 where she was started on norethindrone and referred to radiology for possible uterine fibroid embolization  2/22/24 - She was last seen by Dr. Louis 8/31/23 and was ordered to receive 2 doses IV iron sucrose (Venofer) and to f/u 4 months later but did not  2/27/24 - Patient ordered to receive additional cycle IV iron sucrose (Venofer) at 300mg x 3 once weekly doses  6/4/24 - Patient ordered to receive additional cycle IV iron sucrose (Venofer) at 300mg x 3 once weekly doses as recent labs reveal iron deficiency  9/5/24 - Patient reports she was started on Metformin & Semaglutide recently. She also reports that her periods have shortened in duration to approximately 3 days    Patient was advised at last visit, that  this provider would call her today at a time convenient to review results & provide time for questions and answers and that if patient was unavailable, a detailed message discussing results, plan of care and contact information would be provided. Patient verbalized understanding. 2 attempts were made to reach patient at the available numbers in patient's EMR. Attempts were made at 10:00 and 10:27am. Per previous visit, patient provided this provider with permission to leave message on voicemail discussing results and plan of care. Voicemail left on patient's voicemail detailing results and plan of care. Left callback information with instructions to contact this clinic with any questions or concerns.    Relevant Hx  8/31/23 - Last Visit w/ Dr. Heriberto TIDWELL JAMES SUSI is a 47 year old Female with a PMH significant for krystal-en-y gastric bypass for bariatric surgery, complicated by small bowel resections, uterine fibroids and menorrhagia, significant anemia since at least 3yrs, GERD, anxiety/depression  and h/o esophageal strictures who is referred for evaluation of her anemia. Presents with her sister.      Pt reports she was told she has aplastic anemia 3yrs back but has effectively received no treatment since then. Has instead gone to the ER for transfusions since the beginning of this year, last received 2U RBC in May. She had presented with chest pain,  SOB, lightheadedness. She also received venofer at [facility] ~6 months back. Her preceding labs over last few years has shown anisocytosis, microcytosis, with Hb 7-9, in 2020 had mixed KATERINA and AI, with normal B12. By the end of 2020 she  had become clearly iron deficient. She was seen by  in 2020 and there was a plan to give her IV iron, additionally there was note that she was also on sublingual B12. No follow up since then.      Reports chronic menorrhagia, with passage of large clots for yrs, and follows with gynecology at [place]. Pt  reports that she does not take PO iron and her diet is very limited to a particular brand of mozzarella. She eats nothing else. She notes that  during her management for esophageal strictures, her diet had to be limited and she has not been able to get herself to eat more despite no longer having any restrictions.     Does not have a family h/o anemia, but mother had thyroid and then breast cancer (70s), maternal grandmother had breast and lung cancer. Sister has been tested and is BRCA negative, pt has not been tested.      Other than menstrual bleeding has no other reported sources of bleeding. Has not had a mammo in the last 5yrs, never had a colo, last upper endoscopy was 3yrs back and was noted to have a hiatal hernia.    PMHx:  Active Ambulatory Problems     Diagnosis Date Noted    Anxiety 09/05/2023    Depression 09/05/2023    Hypokalemia 09/05/2023    Iron deficiency anemia secondary to inadequate dietary iron intake 09/05/2023    Abnormal uterine bleeding 09/28/2023    Anemia 09/28/2023    Fibroid 09/28/2023    Glossitis 09/28/2023    Oral candidiasis 09/28/2023    Pyelonephritis 09/28/2023    Urinary frequency 09/28/2023    Postsurgical malabsorption ([facility]-HCC) 02/27/2024    Malabsorption of iron (HHS-HCC) 02/27/2024    Iron deficiency 02/27/2024    Aplastic anemia, unspecified 01/28/2025     Resolved Ambulatory Problems     Diagnosis Date Noted    No Resolved Ambulatory Problems     Past Medical History:   Diagnosis Date    Low vitamin D level     Overweight     Personal history of other mental and behavioral disorders 07/17/2014    Personal history of other mental and behavioral disorders     Postmenopausal     Pure hypercholesterolemia     Sleep disorder, unspecified     Type 2 diabetes mellitus        PSHx:  Past Surgical History:   Procedure Laterality Date    GASTRIC BYPASS  07/17/2014    Gastric Surgery For Morbid Obesity Bypass With Windy-en-Y       FHx:  Family History   Problem Relation Name Age of  Onset    Thyroid cancer Mother      Breast cancer Mother      Hypertension Father      Cancer Other      Endometriosis Other      Diabetes Other      Hypertension Other         Social Hx:  Debby Polanco    reports that she has never smoked. She has never been exposed to tobacco smoke. She has never used smokeless tobacco.  She  reports no history of alcohol use.  She  reports no history of drug use.  Social History     Socioeconomic History    Marital status: Single   Occupational History    Occupation: She is a retired nurse.   Tobacco Use    Smoking status: Never     Passive exposure: Never    Smokeless tobacco: Never   Substance and Sexual Activity    Alcohol use: Never    Drug use: Never      Occupation: Former nurse, now working in [employer]  Marital Status:   Alcohol Use: Denies  Smoking: Never smoker  Recreational Drug Use: Denies    Medications and allergies reviewed in EMR.    ROS:  Review of Systems - Oncology   10 point review of systems negative except as state in HPI.    Vitals & Statistics:  Objective   BSA: 1.71 meters squared  /82 (BP Location: Right arm, Patient Position: Sitting, BP Cuff Size: Adult long)   Pulse 70   Temp 36.4 °C (97.5 °F) (Temporal)   Resp 18   Wt 67 kg (147 lb 9.6 oz)   SpO2 97%   BMI 27.00 kg/m²     Physical Exam:  Vitals and nursing note reviewed.   Constitutional:       Appearance: Normal appearance. She is normal weight.   HENT:      Head: Normocephalic and atraumatic.      Right Ear: External ear normal.      Left Ear: External ear normal.      Nose: Nose normal.      Mouth/Throat:      Mouth: Mucous membranes are moist.      Pharynx: Oropharynx is clear.   Eyes:      Extraocular Movements: Extraocular movements intact.      Conjunctiva/sclera: Conjunctivae normal.      Pupils: Pupils are equal, round, and reactive to light.   Cardiovascular:      Rate and Rhythm: Normal rate and regular rhythm.      Pulses: Normal pulses.      Heart sounds: Normal  heart sounds.   Pulmonary:      Effort: Pulmonary effort is normal.      Breath sounds: Normal breath sounds.   Abdominal:      General: Abdomen is flat. Bowel sounds are normal.      Palpations: Abdomen is soft.      Comments: No masses or organomegaly palpable during exam   Musculoskeletal:         General: Normal range of motion.      Cervical back: Normal range of motion.   Lymphadenopathy:      Comments: No lymphadenopathy palpable   Skin:     General: Skin is warm and dry.      Capillary Refill: Capillary refill takes less than 2 seconds.   Neurological:      Mental Status: She is alert and oriented to person, place, and time. Mental status is at baseline.   Psychiatric:         Mood and Affect: Mood normal.         Behavior: Behavior normal.         Thought Content: Thought content normal.         Judgment: Judgment normal.     Results:  Lab Results   Component Value Date    WBC 8.7 07/07/2025    NEUTROABS 4.64 09/03/2024    IGABSOL 0.02 09/03/2024    LYMPHSABS 2.31 09/03/2024    MONOSABS 0.50 09/03/2024    EOSABS 0.18 09/03/2024    BASOSABS 0.11 (H) 09/03/2024    RBC 4.28 07/07/2025    MCV 90.7 07/07/2025    MCHC 33.0 07/07/2025    HGB 12.8 07/07/2025    HCT 38.8 07/07/2025     07/07/2025     Lab Results   Component Value Date    RETICCTPCT 1.8 04/24/2023      Lab Results   Component Value Date    CREATININE 0.80 07/07/2025    BUN 15 07/07/2025    EGFR 90 07/07/2025     07/07/2025    K 3.5 07/07/2025     07/07/2025    CO2 27 07/07/2025      Lab Results   Component Value Date    ALT 11 07/07/2025    AST 14 07/07/2025    ALKPHOS 55 07/07/2025    BILITOT 0.3 07/07/2025      Lab Results   Component Value Date    TSH 1.13 07/07/2025     Lab Results   Component Value Date    TSH 1.13 07/07/2025     Lab Results   Component Value Date    IRON 129 09/03/2024    TIBC 377 09/03/2024    FERRITIN 178 (H) 09/03/2024      Lab Results   Component Value Date    AITPOOBO48 315 05/31/2024      Lab Results  "  Component Value Date    FOLATE 7.6 05/31/2024     No results found for: \"JANNIE\", \"RF\", \"SEDRATE\"   No results found for: \"CRP\"   No results found for: \"MAMTA\"  Lab Results   Component Value Date     07/25/2022     Lab Results   Component Value Date    HAPTOGLOBIN 105 07/25/2022     Lab Results   Component Value Date    SPEP NORMAL 07/25/2022     No results found for: \"IGG\", \"IGM\", \"IGA\"  No results found for: \"HEPATOT\", \"HEPAIGM\", \"HEPBCIGM\", \"HEPBCAB\", \"HEPBSAG\", \"HEPCAB\"  No results found for: \"HIV1X2\"    Assessment:  Debby Polanco is a 50 y.o. female following up today for KATERINA (2/2 post-surgical malabsorption s/p RYGB, AUB, uterine fibroids)     07/11/25: labs are pending. But her menses are much shorter and less intense. That is why she thinks she may not have iron def. Will call her tomorrow.    RTC in 6 months with labs    Jeff Gastelum MD     "

## 2025-07-14 NOTE — PROGRESS NOTES
Patient here for establisged pt/ new MD visit with Dr Jeff Gastelum for Dx of KATERINA   Patient here alone    Medications and Allergies reviewed and reconciled this visit by MD per her request     No concerns or complaints noted at this time.     Pt reports appetite is good. Enrgy level is low   Dizziness: denies   Bleeding: heavy periods '  PICA: denies   Fevers/Chills/weight loss/night sweats: denies   Pt reports high anxiety which has been mistaken for trying to dictate care or not being truthful to providers.       Follow up per  MD  request. 07/11/25: labs are pending. But her menses are much shorter and less intense. That is why she thinks she may not have iron def. Will call her tomorrow.     RTC in 6 months with labs    Pt. reports availability and use of mychart, Reviewed this is a good place to communicate with the team as well as review labs and upcoming orders.   No barriers to education noted, patient agrees to current plan and verbalized understanding using teach back method.

## 2025-07-15 ENCOUNTER — TELEPHONE (OUTPATIENT)
Dept: HEMATOLOGY/ONCOLOGY | Facility: CLINIC | Age: 50
End: 2025-07-15
Payer: MEDICAID

## 2025-07-15 LAB
FERRITIN SERPL-MCNC: 91 NG/ML (ref 8–150)
FOLATE SERPL-MCNC: 6.8 NG/ML
IRON SATN MFR SERPL: 21 % (ref 25–45)
IRON SERPL-MCNC: 74 UG/DL (ref 35–150)
TIBC SERPL-MCNC: 351 UG/DL (ref 240–445)
UIBC SERPL-MCNC: 277 UG/DL (ref 110–370)
VIT B12 SERPL-MCNC: 238 PG/ML (ref 211–911)

## 2025-07-15 RX ORDER — MAGNESIUM 200 MG
1 TABLET ORAL DAILY
Qty: 90 TABLET | Refills: 1 | Status: SHIPPED | OUTPATIENT
Start: 2025-07-15 | End: 2026-01-11

## 2025-07-15 NOTE — TELEPHONE ENCOUNTER
Reason for Conversation  No chief complaint on file.    Background    TCT pt as directed by Dr Aguilar/  she was updated that based on labs from yesterday she has vit b12 def.  MD ordered sublingual vit b12 to be used for 6 months . Pt will get at pharmacy. Teach back done     Disposition   No disposition on file.